# Patient Record
Sex: MALE | Race: WHITE | Employment: FULL TIME | URBAN - METROPOLITAN AREA
[De-identification: names, ages, dates, MRNs, and addresses within clinical notes are randomized per-mention and may not be internally consistent; named-entity substitution may affect disease eponyms.]

---

## 2017-08-12 ENCOUNTER — HOSPITAL ENCOUNTER (EMERGENCY)
Age: 52
Discharge: HOME | End: 2017-08-12
Attending: EMERGENCY MEDICINE
Payer: COMMERCIAL

## 2017-08-12 VITALS
RESPIRATION RATE: 20 BRPM | WEIGHT: 165 LBS | BODY MASS INDEX: 24.44 KG/M2 | HEIGHT: 69 IN | HEART RATE: 79 BPM | SYSTOLIC BLOOD PRESSURE: 140 MMHG | DIASTOLIC BLOOD PRESSURE: 91 MMHG | OXYGEN SATURATION: 98 %

## 2017-08-12 DIAGNOSIS — H66.90 ACUTE OTITIS MEDIA, UNSPECIFIED LATERALITY, UNSPECIFIED OTITIS MEDIA TYPE: ICD-10-CM

## 2017-08-12 DIAGNOSIS — H60.501 ACUTE OTITIS EXTERNA OF RIGHT EAR, UNSPECIFIED TYPE: Primary | ICD-10-CM

## 2017-08-12 PROCEDURE — 99282 EMERGENCY DEPT VISIT SF MDM: CPT

## 2017-08-12 RX ORDER — AMOXICILLIN AND CLAVULANATE POTASSIUM 875; 125 MG/1; MG/1
1 TABLET, FILM COATED ORAL 2 TIMES DAILY
Qty: 20 TAB | Refills: 0 | Status: SHIPPED | OUTPATIENT
Start: 2017-08-12 | End: 2017-08-22

## 2017-08-12 RX ORDER — ONDANSETRON 4 MG/1
4 TABLET, ORALLY DISINTEGRATING ORAL
Qty: 10 TAB | Refills: 0 | Status: SHIPPED | OUTPATIENT
Start: 2017-08-12 | End: 2017-09-01

## 2017-08-12 RX ORDER — HYDROCODONE BITARTRATE AND ACETAMINOPHEN 5; 325 MG/1; MG/1
1 TABLET ORAL
Qty: 20 TAB | Refills: 0 | Status: SHIPPED | OUTPATIENT
Start: 2017-08-12 | End: 2017-09-01

## 2017-08-12 RX ORDER — CIPROFLOXACIN AND DEXAMETHASONE 3; 1 MG/ML; MG/ML
4 SUSPENSION/ DROPS AURICULAR (OTIC) 2 TIMES DAILY
Qty: 7.5 ML | Refills: 0 | Status: SHIPPED | OUTPATIENT
Start: 2017-08-12 | End: 2017-09-01

## 2017-08-12 NOTE — DISCHARGE INSTRUCTIONS
Swimmer's Ear: Care Instructions  Your Care Instructions    Swimmer's ear (otitis externa) is inflammation or infection of the ear canal. This is the passage that leads from the outer ear to the eardrum. Any water, sand, or other debris that gets into the ear canal and stays there can cause swimmer's ear. Putting cotton swabs or other items in the ear to clean it can also cause this problem. Swimmer's ear can be very painful. But you can treat the pain and infection with medicines. You should feel better in a few days. Follow-up care is a key part of your treatment and safety. Be sure to make and go to all appointments, and call your doctor if you are having problems. It's also a good idea to know your test results and keep a list of the medicines you take. How can you care for yourself at home? Cleaning and care  · Use antibiotic drops as your doctor directs. · Do not insert ear drops (other than the antibiotic ear drops) or anything else into the ear unless your doctor has told you to. · Avoid getting water in the ear until the problem clears up. Use cotton lightly coated with petroleum jelly as an earplug. Do not use plastic earplugs. · Use a hair dryer set on low to carefully dry the ear after you shower. · To ease ear pain, hold a warm washcloth against your ear. · Take pain medicines exactly as directed. ¨ If the doctor gave you a prescription medicine for pain, take it as prescribed. ¨ If you are not taking a prescription pain medicine, ask your doctor if you can take an over-the-counter medicine. Inserting ear drops  · Warm the drops to body temperature by rolling the container in your hands. Or you can place it in a cup of warm water for a few minutes. · Lie down, with your ear facing up. · Place drops inside the ear. Follow your doctor's instructions (or the directions on the label) for how many drops to use.  Gently wiggle the outer ear or pull the ear up and back to help the drops get into the ear. · It's important to keep the liquid in the ear canal for 3 to 5 minutes. When should you call for help? Call your doctor now or seek immediate medical care if:  · You have a new or higher fever. · You have new or worse pain, swelling, warmth, or redness around or behind your ear. · You have new or increasing pus or blood draining from your ear. Watch closely for changes in your health, and be sure to contact your doctor if:  · You are not getting better after 2 days (48 hours). Where can you learn more? Go to http://abdullahi-gallito.info/. Enter C706 in the search box to learn more about \"Swimmer's Ear: Care Instructions. \"  Current as of: July 29, 2016  Content Version: 11.3  © 4643-8984 [employer]. Care instructions adapted under license by [employer] (which disclaims liability or warranty for this information). If you have questions about a medical condition or this instruction, always ask your healthcare professional. [place] [address] any warranty or liability for your use of this information. Ear Infection (Otitis Media): Care Instructions  Your Care Instructions    An ear infection may start with a cold and affect the middle ear (otitis media). It can hurt a lot. Most ear infections clear up on their own in a couple of days. Most often you will not need antibiotics. This is because many ear infections are caused by a virus. Antibiotics don't work against a virus. Regular doses of pain medicines are the best way to reduce your fever and help you feel better. Follow-up care is a key part of your treatment and safety. Be sure to make and go to all appointments, and call your doctor if you are having problems. It's also a good idea to know your test results and keep a list of the medicines you take. How can you care for yourself at home? · Take pain medicines exactly as directed.   ¨ If the doctor gave you a prescription medicine for pain, take it as prescribed. ¨ If you are not taking a prescription pain medicine, take an over-the-counter medicine, such as acetaminophen (Tylenol), ibuprofen (Advil, Motrin), or naproxen (Aleve). Read and follow all instructions on the label. ¨ Do not take two or more pain medicines at the same time unless the doctor told you to. Many pain medicines have acetaminophen, which is Tylenol. Too much acetaminophen (Tylenol) can be harmful. · Plan to take a full dose of pain reliever before bedtime. Getting enough sleep will help you get better. · Try a warm, moist washcloth on the ear. It may help relieve pain. · If your doctor prescribed antibiotics, take them as directed. Do not stop taking them just because you feel better. You need to take the full course of antibiotics. When should you call for help? Call your doctor now or seek immediate medical care if:  · You have new or increasing ear pain. · You have new or increasing pus or blood draining from your ear. · You have a fever with a stiff neck or a severe headache. Watch closely for changes in your health, and be sure to contact your doctor if:  · You have new or worse symptoms. · You are not getting better after taking an antibiotic for 2 days. Where can you learn more? Go to http://abdullahi-gallito.info/. Enter N012 in the search box to learn more about \"Ear Infection (Otitis Media): Care Instructions. \"  Current as of: May 4, 2017  Content Version: 11.3  © 3695-5746 [employer]. Care instructions adapted under license by [employer] (which disclaims liability or warranty for this information). If you have questions about a medical condition or this instruction, always ask your healthcare professional. [address] any warranty or liability for your use of this information. We hope that we have addressed all of your medical concerns.  The examination and treatment you received in the Emergency Department were for an emergent problem and were not intended as complete care. It is important that you follow up with your healthcare provider(s) for ongoing care. If your symptoms worsen or do not improve as expected, and you are unable to reach your usual health care provider(s), you should return to the Emergency Department. Today's healthcare is undergoing tremendous change, and patient satisfaction surveys are one of the many tools to assess the quality of medical care. You may receive a survey from the [employer] regarding your experience in the Emergency Department. I hope that your experience has been completely positive, particularly the medical care that I provided. As such, please participate in the survey; anything less than excellent does not meet my expectations or intentions. [facility] and [facility] participate in nationally recognized quality of care measures. If your blood pressure is greater than 120/80, as reported below, we urge that you seek medical care to address the potential of high blood pressure, commonly known as hypertension. Hypertension can be hereditary or can be caused by certain medical conditions, pain, stress, or \"white coat syndrome. \"       Please make an appointment with your health care provider(s) for follow up of your Emergency Department visit. VITALS:   Patient Vitals for the past 8 hrs:   Pulse Resp BP SpO2   08/12/17 0715 79 20 (!) 140/91 98 %          Thank you for allowing us to provide you with medical care today. We realize that you have many choices for your emergency care needs. Please choose us in the future for any continued health care needs. Eugenia Cole, [address] [place] [address].   Office: [phone]

## 2017-08-12 NOTE — ED NOTES
MD reviewed discharge instructions and options with patient; patient verbalized understanding. RN reviewed discharge instructions using teachback method. Pt. Was wheeled to exit accompanied by RN due to unsteady gait and dizziness. Pt was escorted by father who will drive him home. Patient was counseled on medications prescribed at discharge. Patient to call PCP on Monday for follow up.

## 2017-08-12 NOTE — ED PROVIDER NOTES
HPI Comments: 47 y.o. male who presents from home via private vehicle with chief complaint of right ear pain. Pt reports that he was seen by his PCP 3 days ago c/o right ear pain and was dx with an ear infection. He was started on Augmentin 3 nights ago and has been using OTC ear drops for \"ear ache\" without relief. Pt reports one episode of vomiting this morning with continued nausea after taking two ASA. He took his morning dose of ABx after vomiting. Pt additionally c/o not sleeping well for the past 2 nights, sore throat, dizziness, and decreased hearing ability on the right side. Pt denies any other pain, fever, ear drainage, and any bowel or bladder complaints. There are no other acute medical concerns at this time. Social hx:       7:36 AM      The history is provided by the patient. No  was used. No past medical history on file. No past surgical history on file. No family history on file. Social History     Social History    Marital status: SINGLE     Spouse name: N/A    Number of children: N/A    Years of education: N/A     Occupational History    Not on file. Social History Main Topics    Smoking status: Not on file    Smokeless tobacco: Not on file    Alcohol use Not on file    Drug use: Not on file    Sexual activity: Not on file     Other Topics Concern    Not on file     Social History Narrative         ALLERGIES: Review of patient's allergies indicates no known allergies. Review of Systems   Constitutional: Negative for chills and fever. HENT: Positive for ear pain, hearing loss and sore throat. Negative for ear discharge and rhinorrhea. Respiratory: Negative for cough and shortness of breath. Cardiovascular: Negative for chest pain. Gastrointestinal: Positive for nausea and vomiting. Negative for abdominal pain and diarrhea. Genitourinary: Negative for dysuria and hematuria. Musculoskeletal: Negative for arthralgias and myalgias. Skin: Negative for pallor and rash. Neurological: Positive for dizziness. Negative for weakness and light-headedness. Psychiatric/Behavioral: Positive for sleep disturbance. All other systems reviewed and are negative. Vitals:    08/12/17 0715   BP: (!) 140/91   Pulse: 79   Resp: 20   SpO2: 98%   Weight: 74.8 kg (165 lb)   Height: 5' 9\" (1.753 m)            Physical Exam     Const:  No acute distress, well developed, well nourished  Head:  Atraumatic, normocephalic. No tenderness over the mastoid. Right ear: Swelling and erythema of the ear canal with drainage. Swelling prevents visualization of the TM. Pain in inner ear with pulling on pinna. Left ear: Normal  Mouth/throat: Posterior oropharynx normal.    Eyes:  PERRL, conjunctiva normal, no scleral icterus  Neck:  Supple, trachea midline  Cardiovascular:  RRR, no murmurs, no gallops, no rubs  Resp:  No resp distress, no increased work of breathing, no wheezes, no rhonchi, no rales,  Abd:  Soft, non-tender, non-distended, no rebound, no guarding, no CVA tenderness  :  Deferred  MSK:  No pedal edema, normal ROM  Neuro:  Alert and oriented x3, no cranial nerve defect  Skin:  Warm, dry, intact  Psych: normal mood and affect, behavior is normal, judgement and thought content is normal    Note written by Mame Rivero, as dictated by Audi Han MD 7:51 AM      MDM  Number of Diagnoses or Management Options  Acute otitis externa of right ear, unspecified type:   Acute otitis media, unspecified laterality, unspecified otitis media type:      Amount and/or Complexity of Data Reviewed  Clinical lab tests: ordered and reviewed  Tests in the radiology section of CPT®: ordered and reviewed  Review and summarize past medical records: yes    Patient Progress  Patient progress: stable    ED Course     Pt. Presents to the ER with right ear pain. Pt. Was recently started on amoxicillin for presumed otitis media.   On exam, he has otitis externa, however, I cannot see his TM, so I will also cover him for otitis media with possible perforated TM. I will cover him with ciprodex and augmentin. Pt. To stop the amoxicillin that he is currently on. I will also add norco and zofran for sx control. Pt. To f/u with his PCP in 2 days or return to the ER with worsening sx.       Procedures

## 2017-08-13 ENCOUNTER — HOSPITAL ENCOUNTER (EMERGENCY)
Age: 52
Discharge: HOME | End: 2017-08-13
Attending: EMERGENCY MEDICINE
Payer: COMMERCIAL

## 2017-08-13 ENCOUNTER — APPOINTMENT (OUTPATIENT)
Dept: CT IMAGING | Age: 52
End: 2017-08-13
Attending: EMERGENCY MEDICINE
Payer: COMMERCIAL

## 2017-08-13 VITALS
SYSTOLIC BLOOD PRESSURE: 108 MMHG | HEIGHT: 69 IN | HEART RATE: 69 BPM | OXYGEN SATURATION: 99 % | DIASTOLIC BLOOD PRESSURE: 64 MMHG | TEMPERATURE: 98.1 F | BODY MASS INDEX: 24.44 KG/M2 | RESPIRATION RATE: 15 BRPM | WEIGHT: 165 LBS

## 2017-08-13 DIAGNOSIS — R42 VERTIGO: ICD-10-CM

## 2017-08-13 DIAGNOSIS — H60.311 ACUTE DIFFUSE OTITIS EXTERNA OF RIGHT EAR: Primary | ICD-10-CM

## 2017-08-13 LAB
ANION GAP BLD CALC-SCNC: 21 MMOL/L (ref 5–15)
BUN BLD-MCNC: 12 MG/DL (ref 9–20)
CA-I BLD-MCNC: 1.11 MMOL/L (ref 1.12–1.32)
CHLORIDE BLD-SCNC: 101 MMOL/L (ref 98–107)
CO2 BLD-SCNC: 23 MMOL/L (ref 21–32)
CREAT BLD-MCNC: 1 MG/DL (ref 0.6–1.3)
GLUCOSE BLD-MCNC: 119 MG/DL (ref 65–100)
HCT VFR BLD CALC: 48 % (ref 36.6–50.3)
HGB BLD-MCNC: 16.3 GM/DL (ref 12.1–17)
POTASSIUM BLD-SCNC: 4 MMOL/L (ref 3.5–5.1)
SERVICE CMNT-IMP: ABNORMAL
SODIUM BLD-SCNC: 140 MMOL/L (ref 136–145)

## 2017-08-13 PROCEDURE — 96361 HYDRATE IV INFUSION ADD-ON: CPT

## 2017-08-13 PROCEDURE — 70486 CT MAXILLOFACIAL W/O DYE: CPT

## 2017-08-13 PROCEDURE — 74011250636 HC RX REV CODE- 250/636: Performed by: EMERGENCY MEDICINE

## 2017-08-13 PROCEDURE — 99285 EMERGENCY DEPT VISIT HI MDM: CPT

## 2017-08-13 PROCEDURE — 96365 THER/PROPH/DIAG IV INF INIT: CPT

## 2017-08-13 PROCEDURE — 70450 CT HEAD/BRAIN W/O DYE: CPT

## 2017-08-13 PROCEDURE — 96375 TX/PRO/DX INJ NEW DRUG ADDON: CPT

## 2017-08-13 PROCEDURE — 74011000258 HC RX REV CODE- 258: Performed by: EMERGENCY MEDICINE

## 2017-08-13 PROCEDURE — 80047 BASIC METABLC PNL IONIZED CA: CPT

## 2017-08-13 RX ORDER — SODIUM CHLORIDE 0.9 % (FLUSH) 0.9 %
5-10 SYRINGE (ML) INJECTION AS NEEDED
Status: DISCONTINUED | OUTPATIENT
Start: 2017-08-13 | End: 2017-08-13 | Stop reason: HOSPADM

## 2017-08-13 RX ORDER — HYDROMORPHONE HYDROCHLORIDE 1 MG/ML
1 INJECTION, SOLUTION INTRAMUSCULAR; INTRAVENOUS; SUBCUTANEOUS
Status: DISCONTINUED | OUTPATIENT
Start: 2017-08-13 | End: 2017-08-13 | Stop reason: HOSPADM

## 2017-08-13 RX ORDER — ONDANSETRON 2 MG/ML
4 INJECTION INTRAMUSCULAR; INTRAVENOUS
Status: COMPLETED | OUTPATIENT
Start: 2017-08-13 | End: 2017-08-13

## 2017-08-13 RX ORDER — DIMENHYDRINATE 50 MG
50 TABLET ORAL
COMMUNITY
End: 2017-09-01

## 2017-08-13 RX ORDER — MECLIZINE HYDROCHLORIDE 25 MG/1
25 TABLET ORAL
Qty: 30 TAB | Refills: 0 | Status: SHIPPED | OUTPATIENT
Start: 2017-08-13 | End: 2017-08-23

## 2017-08-13 RX ORDER — SODIUM CHLORIDE 0.9 % (FLUSH) 0.9 %
5-10 SYRINGE (ML) INJECTION EVERY 8 HOURS
Status: DISCONTINUED | OUTPATIENT
Start: 2017-08-13 | End: 2017-08-13 | Stop reason: HOSPADM

## 2017-08-13 RX ADMIN — Medication 10 ML: at 09:45

## 2017-08-13 RX ADMIN — PIPERACILLIN SODIUM,TAZOBACTAM SODIUM 3.38 G: 3; .375 INJECTION, POWDER, FOR SOLUTION INTRAVENOUS at 10:00

## 2017-08-13 RX ADMIN — SODIUM CHLORIDE 1000 ML: 900 INJECTION, SOLUTION INTRAVENOUS at 09:53

## 2017-08-13 RX ADMIN — HYDROMORPHONE HYDROCHLORIDE 1 MG: 1 INJECTION, SOLUTION INTRAMUSCULAR; INTRAVENOUS; SUBCUTANEOUS at 09:54

## 2017-08-13 RX ADMIN — ONDANSETRON 4 MG: 2 INJECTION INTRAMUSCULAR; INTRAVENOUS at 09:53

## 2017-08-13 NOTE — ED TRIAGE NOTES
Pt states he was seen here yesterday for an ear infection. States he has been vomiting for smooth 4 days. Now is unable to keep meds down that were given to him yesterday. States having increased swelling and pain in right side of face and ear.

## 2017-08-13 NOTE — DISCHARGE INSTRUCTIONS
Dizziness: Care Instructions  Your Care Instructions  Dizziness is the feeling of unsteadiness or fuzziness in your head. It is different than having vertigo, which is a feeling that the room is spinning or that you are moving or falling. It is also different from lightheadedness, which is the feeling that you are about to faint. It can be hard to know what causes dizziness. Some people feel dizzy when they have migraine headaches. Sometimes bouts of flu can make you feel dizzy. Some medical conditions, such as heart problems or high blood pressure, can make you feel dizzy. Many medicines can cause dizziness, including medicines for high blood pressure, pain, or anxiety. If a medicine causes your symptoms, your doctor may recommend that you stop or change the medicine. If it is a problem with your heart, you may need medicine to help your heart work better. If there is no clear reason for your symptoms, your doctor may suggest watching and waiting for a while to see if the dizziness goes away on its own. Follow-up care is a key part of your treatment and safety. Be sure to make and go to all appointments, and call your doctor if you are having problems. It's also a good idea to know your test results and keep a list of the medicines you take. How can you care for yourself at home? · If your doctor recommends or prescribes medicine, take it exactly as directed. Call your doctor if you think you are having a problem with your medicine. · Do not drive while you feel dizzy. · Try to prevent falls. Steps you can take include:  ¨ Using nonskid mats, adding grab bars near the tub, and using night-lights. ¨ Clearing your home so that walkways are free of anything you might trip on. ¨ Letting family and friends know that you have been feeling dizzy. This will help them know how to help you. When should you call for help? Call 911 anytime you think you may need emergency care.  For example, call if:  · You passed out (lost consciousness). · You have dizziness along with symptoms of a heart attack. These may include:  ¨ Chest pain or pressure, or a strange feeling in the chest.  ¨ Sweating. ¨ Shortness of breath. ¨ Nausea or vomiting. ¨ Pain, pressure, or a strange feeling in the back, neck, jaw, or upper belly or in one or both shoulders or arms. ¨ Lightheadedness or sudden weakness. ¨ A fast or irregular heartbeat. · You have symptoms of a stroke. These may include:  ¨ Sudden numbness, tingling, weakness, or loss of movement in your face, arm, or leg, especially on only one side of your body. ¨ Sudden vision changes. ¨ Sudden trouble speaking. ¨ Sudden confusion or trouble understanding simple statements. ¨ Sudden problems with walking or balance. ¨ A sudden, severe headache that is different from past headaches. Call your doctor now or seek immediate medical care if:  · You feel dizzy and have a fever, headache, or ringing in your ears. · You have new or increased nausea and vomiting. · Your dizziness does not go away or comes back. Watch closely for changes in your health, and be sure to contact your doctor if:  · You do not get better as expected. Where can you learn more? Go to http://abdullahi-gallito.info/. Enter N532 in the search box to learn more about \"Dizziness: Care Instructions. \"  Current as of: March 20, 2017  Content Version: 11.3  © 6381-5209 [employer]. Care instructions adapted under license by [employer] (which disclaims liability or warranty for this information). If you have questions about a medical condition or this instruction, always ask your healthcare professional. [place] [address] any warranty or liability for your use of this information.          Swimmer's Ear: Care Instructions  Your Care Instructions    Swimmer's ear (otitis externa) is inflammation or infection of the ear canal. This is the passage that leads from the outer ear to the eardrum. Any water, sand, or other debris that gets into the ear canal and stays there can cause swimmer's ear. Putting cotton swabs or other items in the ear to clean it can also cause this problem. Swimmer's ear can be very painful. But you can treat the pain and infection with medicines. You should feel better in a few days. Follow-up care is a key part of your treatment and safety. Be sure to make and go to all appointments, and call your doctor if you are having problems. It's also a good idea to know your test results and keep a list of the medicines you take. How can you care for yourself at home? Cleaning and care  · Use antibiotic drops as your doctor directs. · Do not insert ear drops (other than the antibiotic ear drops) or anything else into the ear unless your doctor has told you to. · Avoid getting water in the ear until the problem clears up. Use cotton lightly coated with petroleum jelly as an earplug. Do not use plastic earplugs. · Use a hair dryer set on low to carefully dry the ear after you shower. · To ease ear pain, hold a warm washcloth against your ear. · Take pain medicines exactly as directed. ¨ If the doctor gave you a prescription medicine for pain, take it as prescribed. ¨ If you are not taking a prescription pain medicine, ask your doctor if you can take an over-the-counter medicine. Inserting ear drops  · Warm the drops to body temperature by rolling the container in your hands. Or you can place it in a cup of warm water for a few minutes. · Lie down, with your ear facing up. · Place drops inside the ear. Follow your doctor's instructions (or the directions on the label) for how many drops to use. Gently wiggle the outer ear or pull the ear up and back to help the drops get into the ear. · It's important to keep the liquid in the ear canal for 3 to 5 minutes. When should you call for help?   Call your doctor now or seek immediate medical care if:  · You have a new or higher fever. · You have new or worse pain, swelling, warmth, or redness around or behind your ear. · You have new or increasing pus or blood draining from your ear. Watch closely for changes in your health, and be sure to contact your doctor if:  · You are not getting better after 2 days (48 hours). Where can you learn more? Go to http://abdullahi-gallito.info/. Enter C706 in the search box to learn more about \"Swimmer's Ear: Care Instructions. \"  Current as of: July 29, 2016  Content Version: 11.3  © 6384-6651 [employer]. Care instructions adapted under license by [employer] (which disclaims liability or warranty for this information). If you have questions about a medical condition or this instruction, always ask your healthcare professional. [place] [address] any warranty or liability for your use of this information. We hope that we have addressed all of your medical concerns. The examination and treatment you received in the Emergency Department were for an emergent problem and were not intended as complete care. It is important that you follow up with your healthcare provider(s) for ongoing care. If your symptoms worsen or do not improve as expected, and you are unable to reach your usual health care provider(s), you should return to the Emergency Department. Today's healthcare is undergoing tremendous change, and patient satisfaction surveys are one of the many tools to assess the quality of medical care. You may receive a survey from the [employer] organization regarding your experience in the Emergency Department. I hope that your experience has been completely positive, particularly the medical care that I provided. As such, please participate in the survey; anything less than excellent does not meet my expectations or intentions.         [address] [facility] and [address] [place] [address] participate in nationally recognized quality of care measures. If your blood pressure is greater than 120/80, as reported below, we urge that you seek medical care to address the potential of high blood pressure, commonly known as hypertension. Hypertension can be hereditary or can be caused by certain medical conditions, pain, stress, or \"white coat syndrome. \"       Please make an appointment with your health care provider(s) for follow up of your Emergency Department visit. VITALS:   Patient Vitals for the past 8 hrs:   Temp Pulse Resp BP SpO2   08/13/17 1100 - 63 19 113/66 95 %   08/13/17 1030 - 65 10 (!) 110/97 98 %   08/13/17 1014 - 61 14 116/74 99 %   08/13/17 1000 - 68 16 110/71 98 %   08/13/17 0928 98.1 °F (36.7 °C) 73 18 128/70 99 %          Thank you for allowing us to provide you with medical care today. We realize that you have many choices for your emergency care needs. Please choose us in the future for any continued health care needs. Regards,           Lexx Peralta, [address] [place] [address].   Office: [phone]            Recent Results (from the past 24 hour(s))   POC CHEM8    Collection Time: 08/13/17  9:48 AM   Result Value Ref Range    Calcium, ionized (POC) 1.11 (L) 1.12 - 1.32 MMOL/L    Sodium (POC) 140 136 - 145 MMOL/L    Potassium (POC) 4.0 3.5 - 5.1 MMOL/L    Chloride (POC) 101 98 - 107 MMOL/L    CO2 (POC) 23 21 - 32 MMOL/L    Anion gap (POC) 21 (H) 5 - 15 mmol/L    Glucose (POC) 119 (H) 65 - 100 MG/DL    BUN (POC) 12 9 - 20 MG/DL    Creatinine (POC) 1.0 0.6 - 1.3 MG/DL    GFRAA, POC >60 >60 ml/min/1.73m2    GFRNA, POC >60 >60 ml/min/1.73m2    Hemoglobin (POC) 16.3 12.1 - 17.0 GM/DL    Hematocrit (POC) 48 36.6 - 50.3 %    Comment Notified RN or MD immediately by          Ct Head Wo Cont    Result Date: 8/13/2017  EXAM:  CT HEAD WO CONT INDICATION:   vertigo, ataxia COMPARISON: None. CONTRAST:  None.  TECHNIQUE: Unenhanced CT of the head was performed using 5 mm images. Brain and bone windows were generated. CT dose reduction was achieved through use of a standardized protocol tailored for this examination and automatic exposure control for dose modulation. FINDINGS: There is no extra-axial fluid collection hemorrhage shift or masses. impression: No acute findings. Ct Maxillofacial Wo Cont    Result Date: 8/13/2017  EXAM:  CT MAXILLOFACIAL WO CONT INDICATION:   swelling, pain, erythema to right ear COMPARISON:  None. CONTRAST:   None. TECHNIQUE:  Multislice helical CT of the facial bones was performed in the axial plane without intravenous contrast administration. Coronal and sagittal reformations were generated. CT dose reduction was achieved through use of a standardized protocol tailored for this examination and automatic exposure control for dose modulation. FINDINGS: There is no fracture. There is superficial soft tissue swelling around the right ear but no fluid collection     impression: No fluid collection.

## 2017-08-13 NOTE — ED NOTES
Bedside and Verbal shift change report given to Queen Iveth RN (oncoming nurse) by Marine Werner RN (offgoing nurse). Report included the following information SBAR, ED Summary and MAR.

## 2017-08-13 NOTE — ED PROVIDER NOTES
HPI Comments: 47 y.o. male with no significant past medical history who presents from home with chief complaint of vomiting. Pt began experiencing ear pain 4 days ago and went into [facility] where he was given Abx with no relief. Pt's symptoms began to worsen and 3 days ago he began experiencing ear swelling, vomiting, and dizziness. Pt was then seen in the ED yesterday where he was diagnosed with acute otitis externa of the R-ear and d/c home with medications. Despite this, pt reports his sx have worsened, currently c/o 10/10 ear pain, vomiting, and new dizziness that \"feels like he is going to fall down\", and weakness with \"inability to walk or stand. \" Pt last vomited ~1 hour ago and states he thinks he threw up his ABx. Pt states that his throat feels \"raw. \"  Pt denies any recent injuries to head or ear, falls, or swimming. Pt denies taking any medications. There are no other acute medical concerns at this time. Old Chart Review: Pt was seen in the ED on 8/12/17 by Dr. Sasha Trejo for acute otitis external of the R-ear. Pt c/o dizziness, sleep disturbance, decreased hearing, and a sore throat at the time. Pt was given Ciprodex, Augmentin, norco, and Zofran for presumed otitis media. Social hx: (-) tobacco use; (-) EtOH use. Note written by Mame Hopson, as dictated by Andree Diamond MD 9:35am     The history is provided by the patient and a parent. No  was used. No past medical history on file. No past surgical history on file. No family history on file. Social History     Social History    Marital status: SINGLE     Spouse name: N/A    Number of children: N/A    Years of education: N/A     Occupational History    Not on file.      Social History Main Topics    Smoking status: Never Smoker    Smokeless tobacco: Never Used    Alcohol use No    Drug use: Not on file    Sexual activity: Not on file     Other Topics Concern    Not on file Social History Narrative    No narrative on file         ALLERGIES: Review of patient's allergies indicates no known allergies. Review of Systems   Constitutional: Negative for appetite change, fever and unexpected weight change. HENT: Positive for ear pain, facial swelling and sore throat. Negative for hearing loss, nosebleeds, rhinorrhea and trouble swallowing. Respiratory: Negative. Negative for cough, chest tightness and shortness of breath. Cardiovascular: Negative. Negative for chest pain and palpitations. Gastrointestinal: Positive for nausea and vomiting. Negative for abdominal distention, abdominal pain and blood in stool. Endocrine: Negative. Genitourinary: Negative for dysuria and hematuria. Musculoskeletal: Negative. Negative for back pain and myalgias. Skin: Negative. Negative for rash. Allergic/Immunologic: Negative. Neurological: Positive for dizziness and weakness. Negative for syncope and numbness. Hematological: Negative. Psychiatric/Behavioral: Negative. All other systems reviewed and are negative. Vitals:    08/13/17 0928   BP: 128/70   Pulse: 73   Resp: 18   Temp: 98.1 °F (36.7 °C)   SpO2: 99%   Weight: 74.8 kg (165 lb)   Height: 5' 9\" (1.753 m)            Physical Exam   Constitutional: He is oriented to person, place, and time. He appears well-developed and well-nourished. He appears distressed. Nontoxic. Moderate pain distress. Somewhat ataxic when standing. Awake, alert, and oriented with no acute focal deficits. HENT:   Head: Normocephalic and atraumatic. Right Ear: External ear normal.   Left Ear: External ear normal.   Nose: Nose normal.   Mouth/Throat: Oropharynx is clear and moist.   Normal except for moderate swelling to the R-external ear canal as well as the external ear itself with some erythema and moderate tenderness to palpation. No obvious abscess. No parotid or mastoid tenderness.  Mild horizontal nystagmus when looking to the left. Eyes: Conjunctivae are normal. Pupils are equal, round, and reactive to light. Left eye exhibits nystagmus. Normal fundoscopic exam.    Neck: Normal range of motion. Neck supple. No JVD present. No thyromegaly present. Cardiovascular: Normal rate, regular rhythm, normal heart sounds and intact distal pulses. No murmur heard. Pulmonary/Chest: Effort normal and breath sounds normal. No respiratory distress. He has no wheezes. He has no rales. Abdominal: Soft. Bowel sounds are normal. He exhibits no distension. There is no tenderness. Musculoskeletal: Normal range of motion. He exhibits no edema. Neurological: He is alert and oriented to person, place, and time. No cranial nerve deficit. Somewhat ataxic when standing. Skin: Skin is warm and dry. No rash noted. Psychiatric: He has a normal mood and affect. His behavior is normal. Thought content normal.      Note written by Mame Vega, as dictated by Yadira Merrill MD 9:35am    [facility]  ED Course       Procedures        PROGRESS NOTE:  11:13 AM  Assessment and Plan: Chem 8 is unremarkable. Head CT normal. Maxillofacial CT shows soft tissue swelling but no fluid collection around R-ear. Pt was given a dose of Abx in the ER as well as pain medication with improvement of symptoms. Will discharge the pt home and add Meclizine for his dizziness. Pt is advised to continue his home abx until finished and to f/u with ENT this week.

## 2017-08-13 NOTE — ED NOTES
Assumed care of pt. Bed locked and in low position with call bell within reach. Using AIDET-Introduced self as Primary RN and plan discussed with pt with understanding was verbalized. Pt denies additional complaints at this time. White board updated with this nurse's name. Patient advised that medical information will be discussed and it is their own responsibility to tell nurse if such conversation should not take place in the presence of visitors. Pt verbalizes understanding. Family at bedside.

## 2017-09-01 ENCOUNTER — APPOINTMENT (OUTPATIENT)
Dept: CT IMAGING | Age: 52
DRG: 074 | End: 2017-09-01
Attending: PHYSICIAN ASSISTANT
Payer: COMMERCIAL

## 2017-09-01 ENCOUNTER — HOSPITAL ENCOUNTER (INPATIENT)
Age: 52
LOS: 3 days | Discharge: HOME | DRG: 074 | End: 2017-09-04
Attending: EMERGENCY MEDICINE | Admitting: INTERNAL MEDICINE
Payer: COMMERCIAL

## 2017-09-01 ENCOUNTER — APPOINTMENT (OUTPATIENT)
Dept: CT IMAGING | Age: 52
DRG: 074 | End: 2017-09-01
Attending: INTERNAL MEDICINE
Payer: COMMERCIAL

## 2017-09-01 ENCOUNTER — APPOINTMENT (OUTPATIENT)
Dept: GENERAL RADIOLOGY | Age: 52
DRG: 074 | End: 2017-09-01
Attending: INTERNAL MEDICINE
Payer: COMMERCIAL

## 2017-09-01 ENCOUNTER — APPOINTMENT (OUTPATIENT)
Dept: MRI IMAGING | Age: 52
DRG: 074 | End: 2017-09-01
Attending: INTERNAL MEDICINE
Payer: COMMERCIAL

## 2017-09-01 ENCOUNTER — APPOINTMENT (OUTPATIENT)
Dept: ULTRASOUND IMAGING | Age: 52
DRG: 074 | End: 2017-09-01
Attending: INTERNAL MEDICINE
Payer: COMMERCIAL

## 2017-09-01 DIAGNOSIS — R63.4 WEIGHT LOSS, NON-INTENTIONAL: ICD-10-CM

## 2017-09-01 DIAGNOSIS — B02.29 HERPES ZOSTER VIRUS INFECTION OF FACE AND EAR NERVES: ICD-10-CM

## 2017-09-01 DIAGNOSIS — R11.2 NAUSEA AND VOMITING IN ADULT: ICD-10-CM

## 2017-09-01 DIAGNOSIS — R17 JAUNDICE: Primary | ICD-10-CM

## 2017-09-01 DIAGNOSIS — K82.8 PORCELAIN GALLBLADDER: ICD-10-CM

## 2017-09-01 PROBLEM — D72.829 LEUKOCYTOSIS: Status: ACTIVE | Noted: 2017-09-01

## 2017-09-01 PROBLEM — E80.6 HYPERBILIRUBINEMIA: Status: ACTIVE | Noted: 2017-09-01

## 2017-09-01 PROBLEM — N28.9 ACUTE RENAL INSUFFICIENCY: Status: ACTIVE | Noted: 2017-09-01

## 2017-09-01 PROBLEM — R29.810 FACIAL WEAKNESS: Status: ACTIVE | Noted: 2017-09-01

## 2017-09-01 PROBLEM — E87.1 HYPONATREMIA: Status: ACTIVE | Noted: 2017-09-01

## 2017-09-01 LAB
ALBUMIN SERPL-MCNC: 2.9 G/DL (ref 3.5–5)
ALBUMIN SERPL-MCNC: 3.7 G/DL (ref 3.5–5)
ALBUMIN/GLOB SERPL: 1 {RATIO} (ref 1.1–2.2)
ALBUMIN/GLOB SERPL: 1.1 {RATIO} (ref 1.1–2.2)
ALP SERPL-CCNC: 72 U/L (ref 45–117)
ALP SERPL-CCNC: 90 U/L (ref 45–117)
ALT SERPL-CCNC: 41 U/L (ref 12–78)
ALT SERPL-CCNC: 57 U/L (ref 12–78)
AMMONIA PLAS-SCNC: 18 UMOL/L
AMORPH CRY URNS QL MICRO: ABNORMAL
ANION GAP SERPL CALC-SCNC: 10 MMOL/L (ref 5–15)
ANION GAP SERPL CALC-SCNC: 11 MMOL/L (ref 5–15)
APPEARANCE UR: ABNORMAL
AST SERPL-CCNC: 21 U/L (ref 15–37)
AST SERPL-CCNC: 36 U/L (ref 15–37)
BACTERIA URNS QL MICRO: NEGATIVE /HPF
BASOPHILS # BLD: 0 K/UL (ref 0–0.1)
BASOPHILS NFR BLD: 0 % (ref 0–1)
BILIRUB DIRECT SERPL-MCNC: 0.2 MG/DL (ref 0–0.2)
BILIRUB INDIRECT SERPL-MCNC: 5.3 MG/DL (ref 0–1.1)
BILIRUB SERPL-MCNC: 5.5 MG/DL (ref 0.2–1)
BILIRUB SERPL-MCNC: 5.5 MG/DL (ref 0.2–1)
BILIRUB SERPL-MCNC: 7.2 MG/DL (ref 0.2–1)
BILIRUB UR QL: NEGATIVE
BUN SERPL-MCNC: 22 MG/DL (ref 6–20)
BUN SERPL-MCNC: 23 MG/DL (ref 6–20)
BUN/CREAT SERPL: 21 (ref 12–20)
BUN/CREAT SERPL: 21 (ref 12–20)
CALCIUM SERPL-MCNC: 8 MG/DL (ref 8.5–10.1)
CALCIUM SERPL-MCNC: 9.4 MG/DL (ref 8.5–10.1)
CHLORIDE SERPL-SCNC: 100 MMOL/L (ref 97–108)
CHLORIDE SERPL-SCNC: 106 MMOL/L (ref 97–108)
CO2 SERPL-SCNC: 21 MMOL/L (ref 21–32)
CO2 SERPL-SCNC: 22 MMOL/L (ref 21–32)
COLOR UR: ABNORMAL
CREAT SERPL-MCNC: 1.05 MG/DL (ref 0.7–1.3)
CREAT SERPL-MCNC: 1.08 MG/DL (ref 0.7–1.3)
EOSINOPHIL # BLD: 0.1 K/UL (ref 0–0.4)
EOSINOPHIL NFR BLD: 1 % (ref 0–7)
EPITH CASTS URNS QL MICRO: ABNORMAL /LPF
ERYTHROCYTE [DISTWIDTH] IN BLOOD BY AUTOMATED COUNT: 13.5 % (ref 11.5–14.5)
GLOBULIN SER CALC-MCNC: 2.7 G/DL (ref 2–4)
GLOBULIN SER CALC-MCNC: 3.8 G/DL (ref 2–4)
GLUCOSE SERPL-MCNC: 105 MG/DL (ref 65–100)
GLUCOSE SERPL-MCNC: 114 MG/DL (ref 65–100)
GLUCOSE UR STRIP.AUTO-MCNC: NEGATIVE MG/DL
GRAN CASTS URNS QL MICRO: ABNORMAL /LPF
HAPTOGLOB SERPL-MCNC: 177 MG/DL (ref 30–200)
HCT VFR BLD AUTO: 50 % (ref 36.6–50.3)
HGB BLD-MCNC: 18 G/DL (ref 12.1–17)
HGB UR QL STRIP: NEGATIVE
INR PPP: 1.1 (ref 0.9–1.1)
KETONES UR QL STRIP.AUTO: 40 MG/DL
LACTATE SERPL-SCNC: 1.9 MMOL/L (ref 0.4–2)
LDH SERPL L TO P-CCNC: 171 U/L (ref 85–241)
LEUKOCYTE ESTERASE UR QL STRIP.AUTO: NEGATIVE
LIPASE SERPL-CCNC: 395 U/L (ref 73–393)
LYMPHOCYTES # BLD: 2.7 K/UL (ref 0.8–3.5)
LYMPHOCYTES NFR BLD: 20 % (ref 12–49)
MAGNESIUM SERPL-MCNC: 2.3 MG/DL (ref 1.6–2.4)
MCH RBC QN AUTO: 30.3 PG (ref 26–34)
MCHC RBC AUTO-ENTMCNC: 36 G/DL (ref 30–36.5)
MCV RBC AUTO: 84.2 FL (ref 80–99)
MONOCYTES # BLD: 1.5 K/UL (ref 0–1)
MONOCYTES NFR BLD: 11 % (ref 5–13)
NEUTS SEG # BLD: 9.2 K/UL (ref 1.8–8)
NEUTS SEG NFR BLD: 68 % (ref 32–75)
NITRITE UR QL STRIP.AUTO: NEGATIVE
PH UR STRIP: 7 [PH] (ref 5–8)
PLATELET # BLD AUTO: 269 K/UL (ref 150–400)
POTASSIUM SERPL-SCNC: 4.1 MMOL/L (ref 3.5–5.1)
POTASSIUM SERPL-SCNC: 4.8 MMOL/L (ref 3.5–5.1)
PROT SERPL-MCNC: 5.6 G/DL (ref 6.4–8.2)
PROT SERPL-MCNC: 7.5 G/DL (ref 6.4–8.2)
PROT UR STRIP-MCNC: ABNORMAL MG/DL
PROTHROMBIN TIME: 11.4 SEC (ref 9–11.1)
RBC # BLD AUTO: 5.94 M/UL (ref 4.1–5.7)
RBC #/AREA URNS HPF: ABNORMAL /HPF (ref 0–5)
SODIUM SERPL-SCNC: 132 MMOL/L (ref 136–145)
SODIUM SERPL-SCNC: 138 MMOL/L (ref 136–145)
SP GR UR REFRACTOMETRY: 1.02 (ref 1–1.03)
TROPONIN I SERPL-MCNC: <0.04 NG/ML
UROBILINOGEN UR QL STRIP.AUTO: 1 EU/DL (ref 0.2–1)
WBC # BLD AUTO: 13.5 K/UL (ref 4.1–11.1)
WBC URNS QL MICRO: ABNORMAL /HPF (ref 0–4)

## 2017-09-01 PROCEDURE — 80053 COMPREHEN METABOLIC PANEL: CPT | Performed by: PHYSICIAN ASSISTANT

## 2017-09-01 PROCEDURE — 81001 URINALYSIS AUTO W/SCOPE: CPT | Performed by: PHYSICIAN ASSISTANT

## 2017-09-01 PROCEDURE — 85025 COMPLETE CBC W/AUTO DIFF WBC: CPT | Performed by: PHYSICIAN ASSISTANT

## 2017-09-01 PROCEDURE — 74011250637 HC RX REV CODE- 250/637: Performed by: INTERNAL MEDICINE

## 2017-09-01 PROCEDURE — 84484 ASSAY OF TROPONIN QUANT: CPT | Performed by: PHYSICIAN ASSISTANT

## 2017-09-01 PROCEDURE — 36415 COLL VENOUS BLD VENIPUNCTURE: CPT | Performed by: PHYSICIAN ASSISTANT

## 2017-09-01 PROCEDURE — 96374 THER/PROPH/DIAG INJ IV PUSH: CPT

## 2017-09-01 PROCEDURE — 76705 ECHO EXAM OF ABDOMEN: CPT

## 2017-09-01 PROCEDURE — 85610 PROTHROMBIN TIME: CPT | Performed by: PHYSICIAN ASSISTANT

## 2017-09-01 PROCEDURE — 82140 ASSAY OF AMMONIA: CPT | Performed by: PHYSICIAN ASSISTANT

## 2017-09-01 PROCEDURE — A9576 INJ PROHANCE MULTIPACK: HCPCS | Performed by: RADIOLOGY

## 2017-09-01 PROCEDURE — 74011250636 HC RX REV CODE- 250/636

## 2017-09-01 PROCEDURE — 83010 ASSAY OF HAPTOGLOBIN QUANT: CPT | Performed by: EMERGENCY MEDICINE

## 2017-09-01 PROCEDURE — 82248 BILIRUBIN DIRECT: CPT | Performed by: EMERGENCY MEDICINE

## 2017-09-01 PROCEDURE — 83605 ASSAY OF LACTIC ACID: CPT | Performed by: PHYSICIAN ASSISTANT

## 2017-09-01 PROCEDURE — 65270000029 HC RM PRIVATE

## 2017-09-01 PROCEDURE — 51701 INSERT BLADDER CATHETER: CPT

## 2017-09-01 PROCEDURE — 80053 COMPREHEN METABOLIC PANEL: CPT | Performed by: INTERNAL MEDICINE

## 2017-09-01 PROCEDURE — 83615 LACTATE (LD) (LDH) ENZYME: CPT | Performed by: EMERGENCY MEDICINE

## 2017-09-01 PROCEDURE — 74177 CT ABD & PELVIS W/CONTRAST: CPT

## 2017-09-01 PROCEDURE — 74011250636 HC RX REV CODE- 250/636: Performed by: RADIOLOGY

## 2017-09-01 PROCEDURE — 74011250636 HC RX REV CODE- 250/636: Performed by: PHYSICIAN ASSISTANT

## 2017-09-01 PROCEDURE — 96361 HYDRATE IV INFUSION ADD-ON: CPT

## 2017-09-01 PROCEDURE — 99285 EMERGENCY DEPT VISIT HI MDM: CPT

## 2017-09-01 PROCEDURE — 93005 ELECTROCARDIOGRAM TRACING: CPT

## 2017-09-01 PROCEDURE — 74011000250 HC RX REV CODE- 250: Performed by: INTERNAL MEDICINE

## 2017-09-01 PROCEDURE — 83690 ASSAY OF LIPASE: CPT | Performed by: PHYSICIAN ASSISTANT

## 2017-09-01 PROCEDURE — 74011250636 HC RX REV CODE- 250/636: Performed by: INTERNAL MEDICINE

## 2017-09-01 PROCEDURE — 83735 ASSAY OF MAGNESIUM: CPT | Performed by: PHYSICIAN ASSISTANT

## 2017-09-01 PROCEDURE — 70553 MRI BRAIN STEM W/O & W/DYE: CPT

## 2017-09-01 PROCEDURE — 71020 XR CHEST PA LAT: CPT

## 2017-09-01 PROCEDURE — 77030005563 HC CATH URETH INT MMGH -A

## 2017-09-01 PROCEDURE — 74011636320 HC RX REV CODE- 636/320: Performed by: RADIOLOGY

## 2017-09-01 RX ORDER — ONDANSETRON 2 MG/ML
4 INJECTION INTRAMUSCULAR; INTRAVENOUS
Status: DISCONTINUED | OUTPATIENT
Start: 2017-09-01 | End: 2017-09-04 | Stop reason: HOSPADM

## 2017-09-01 RX ORDER — SODIUM CHLORIDE 9 MG/ML
100 INJECTION, SOLUTION INTRAVENOUS CONTINUOUS
Status: DISCONTINUED | OUTPATIENT
Start: 2017-09-01 | End: 2017-09-04 | Stop reason: HOSPADM

## 2017-09-01 RX ORDER — ENOXAPARIN SODIUM 100 MG/ML
40 INJECTION SUBCUTANEOUS EVERY 24 HOURS
Status: DISCONTINUED | OUTPATIENT
Start: 2017-09-01 | End: 2017-09-04 | Stop reason: HOSPADM

## 2017-09-01 RX ORDER — ONDANSETRON 4 MG/1
4 TABLET, ORALLY DISINTEGRATING ORAL
Status: ON HOLD | COMMUNITY
End: 2017-09-04

## 2017-09-01 RX ORDER — SODIUM CHLORIDE 0.9 % (FLUSH) 0.9 %
5-10 SYRINGE (ML) INJECTION EVERY 8 HOURS
Status: DISCONTINUED | OUTPATIENT
Start: 2017-09-01 | End: 2017-09-04 | Stop reason: HOSPADM

## 2017-09-01 RX ORDER — DIPHENHYDRAMINE HCL 25 MG
25 CAPSULE ORAL
Status: DISCONTINUED | OUTPATIENT
Start: 2017-09-01 | End: 2017-09-04 | Stop reason: HOSPADM

## 2017-09-01 RX ORDER — SODIUM CHLORIDE 0.9 % (FLUSH) 0.9 %
5-10 SYRINGE (ML) INJECTION AS NEEDED
Status: DISCONTINUED | OUTPATIENT
Start: 2017-09-01 | End: 2017-09-04 | Stop reason: HOSPADM

## 2017-09-01 RX ORDER — ERYTHROMYCIN 5 MG/G
OINTMENT OPHTHALMIC 2 TIMES DAILY
COMMUNITY

## 2017-09-01 RX ORDER — PHENOL/SODIUM PHENOLATE
20 AEROSOL, SPRAY (ML) MUCOUS MEMBRANE DAILY
COMMUNITY

## 2017-09-01 RX ORDER — PROCHLORPERAZINE EDISYLATE 5 MG/ML
10 INJECTION INTRAMUSCULAR; INTRAVENOUS
Status: COMPLETED | OUTPATIENT
Start: 2017-09-01 | End: 2017-09-01

## 2017-09-01 RX ORDER — ERYTHROMYCIN 5 MG/G
OINTMENT OPHTHALMIC 2 TIMES DAILY
Status: DISCONTINUED | OUTPATIENT
Start: 2017-09-01 | End: 2017-09-04 | Stop reason: HOSPADM

## 2017-09-01 RX ORDER — PROCHLORPERAZINE EDISYLATE 5 MG/ML
INJECTION INTRAMUSCULAR; INTRAVENOUS
Status: COMPLETED
Start: 2017-09-01 | End: 2017-09-01

## 2017-09-01 RX ORDER — MECLIZINE HYDROCHLORIDE 25 MG/1
25 TABLET ORAL
Status: ON HOLD | COMMUNITY
End: 2017-09-04

## 2017-09-01 RX ADMIN — IOPAMIDOL 100 ML: 755 INJECTION, SOLUTION INTRAVENOUS at 17:28

## 2017-09-01 RX ADMIN — DIPHENHYDRAMINE HYDROCHLORIDE 25 MG: 25 CAPSULE ORAL at 22:14

## 2017-09-01 RX ADMIN — SODIUM CHLORIDE 125 ML/HR: 900 INJECTION, SOLUTION INTRAVENOUS at 21:42

## 2017-09-01 RX ADMIN — FAMOTIDINE 20 MG: 10 INJECTION INTRAVENOUS at 21:42

## 2017-09-01 RX ADMIN — ENOXAPARIN SODIUM 40 MG: 40 INJECTION SUBCUTANEOUS at 21:42

## 2017-09-01 RX ADMIN — ERYTHROMYCIN: 5 OINTMENT OPHTHALMIC at 21:45

## 2017-09-01 RX ADMIN — PROCHLORPERAZINE EDISYLATE 10 MG: 5 INJECTION INTRAMUSCULAR; INTRAVENOUS at 14:45

## 2017-09-01 RX ADMIN — SODIUM CHLORIDE 1000 ML: 900 INJECTION, SOLUTION INTRAVENOUS at 16:09

## 2017-09-01 RX ADMIN — Medication 10 ML: at 21:42

## 2017-09-01 RX ADMIN — SODIUM CHLORIDE 1000 ML: 900 INJECTION, SOLUTION INTRAVENOUS at 14:45

## 2017-09-01 RX ADMIN — GADOTERIDOL 12 ML: 279.3 INJECTION, SOLUTION INTRAVENOUS at 22:00

## 2017-09-01 NOTE — ED NOTES
Nausea resolved, left flank pain returned, worse when patient is sitting up. Patient leaned back and positioned to comfort. 2nd IVF bag infusing per Miles HOLLAND.

## 2017-09-01 NOTE — H&P
Admission History and Physical      NAME:  Joan Osuna   :   1965   MRN:  [id number]     PCP:  Whitney Blankenship MD     Date/Time:  2017           Assessment/Plan:       Intractable nausea and vomiting (2017): Unclear etiology. Symptoms ongoing for 3 weeks. 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 Surgery called by ED provider and recommended GI evaluation but no need for surgery at this time. Lipase minimally elevated, but more likely from vomiting. No abdominal ttp by exam.  Feels a bit dizzy but no vision changes. No HA or neck stiffness to suspect central etiology for n/v.     -- check abdominal US   -- antiemetics prn   -- IVF hydration   -- check mri brain to eval for central causes of n/v    Leukocytosis (2017): May be from N/V.  UA w/o infectious findings. -- check CXR   -- repeat labs in AM    Hyperbilirubinemia (2017):  Hemolyzed sample however. CT w/o biliary dilatation or masses. -- repeat CMP   -- check ldh, hapto, fractionated bili    Hyponatremia (2017):  Likely from vomiting.   -- hydrate with NS   -- monitor sodium    Acute renal insufficiency (2017):  Likely from n/v and intravascular volume depletion. -- hydrate with NS   -- trend creatinine and bun    Herpes zoster virus infection of face and ear nerves ():  Was followed and monitored by ENT. Was associated with right facial weakness due to facial nerve involvement. Much improved now from initial per patient and father. -- check CT head    Polycythemia:  Likely from IVVD. -- hydrate with IVF   -- repeat HGB in AM         Subjective:     CHIEF COMPLAINT:  N/V    HISTORY OF PRESENT ILLNESS:     Mr. Moraima Aguilar is a 47 y.o.  male who is admitted with Intractable nausea and vomiting. Mr. Moraima Aguilar presented to the Emergency Department today complaining of nausea and vomiting. Symptoms present for 3 weeks. Worse with trying to eat or drink. No fever or chills. Denies abd pains.   Denies diarrhea. No sick contacts. Denies HA. No vision change. No photophobia or neck stiffness. Past Medical History:   Diagnosis Date    H/O scarlet fever     Herpes zoster virus infection of face and ear nerves     8/2017: With facial weakness on right.  Ill-defined condition     spinal contusion    Ill-defined condition     broken bone in left knee        Past Surgical History:   Procedure Laterality Date    HX OTHER SURGICAL      surgery on neck    HX UROLOGICAL Right     surgery/biopsy on right kidney       Social History   Substance Use Topics    Smoking status: Never Smoker    Smokeless tobacco: Never Used    Alcohol use No        Family History   Problem Relation Age of Onset    Hypertension Father         No Known Allergies     Prior to Admission medications    Medication Sig Start Date End Date Taking? Authorizing Provider   Omeprazole delayed release (PRILOSEC D/R) 20 mg tablet Take 20 mg by mouth daily. Yes Phys Other, MD   ondansetron (ZOFRAN ODT) 4 mg disintegrating tablet Take 4 mg by mouth every eight (8) hours as needed for Nausea. Yes Phys Other, MD   erythromycin (ILOTYCIN) ophthalmic ointment Administer  to right eye two (2) times a day. Yes Historical Provider   meclizine (ANTIVERT) 25 mg tablet Take 25 mg by mouth three (3) times daily as needed.    Yes Historical Provider         Review of Systems:  (bold if positive, if negative)    Gen:  Eyes:  ENT:  CVS:  Pulm:  GI:  nausea, emesis  :    MS:  Skin:  Psych:  Endo:    Hem:  Renal:    Neuro:  weakness          Objective:      VITALS:    Vital signs reviewed; most recent are:    Visit Vitals    /76    Pulse 69    Temp 98.9 °F (37.2 °C)    Resp 23    Ht 5' 9\" (1.753 m)    Wt 64.4 kg (142 lb)    SpO2 96%    BMI 20.97 kg/m2     SpO2 Readings from Last 6 Encounters:   09/01/17 96%   08/13/17 99%   08/12/17 98%   01/14/16 99%          Intake/Output Summary (Last 24 hours) at 09/01/17 1931  Last data filed at 09/01/17 1600   Gross per 24 hour   Intake                0 ml   Output              250 ml   Net             -250 ml            Exam:     Physical Exam:    Gen:  Well-developed, well-nourished, in no acute distress  HEENT:  Icteric conjunctivae, PERRL, hearing intact to voice, moist mucous membranes, mild edema of right earlobe, TM clear  Neck:  Supple  Resp:  No accessory muscle use, clear breath sounds without wheezes rales or rhonchi  Card:  No murmurs, normal S1, S2 without thrills or peripheral edema  Abd:  Soft, non-tender, non-distended, normoactive bowel sounds are present  Musc:  No cyanosis  Skin:  No rashes, skin turgor is good  Neuro:  Mild right facial weakness with eyes closing and smile,  strength is 5/5 bilaterally, dorsi / plantarflexion strength is 5/5 bilaterally, follows commands appropriately  Psych:  Alert with good insight. Oriented to person, place, and time       Labs:    Recent Labs      09/01/17   1445   WBC  13.5*   HGB  18.0*   HCT  50.0   PLT  269     Recent Labs      09/01/17   1445   NA  132*   K  4.8   CL  100   CO2  21   GLU  114*   BUN  23*   CREA  1.08   CA  9.4   MG  2.3   ALB  3.7   TBILI  7.2*   SGOT  36   ALT  57     Lab Results   Component Value Date/Time    Glucose (POC) 119 08/13/2017 09:48 AM     No results for input(s): PH, PCO2, PO2, HCO3, FIO2 in the last 72 hours. Recent Labs      09/01/17   1445   INR  1.1     EKG reviewed:   Sinus rhythm. No acute ischemic st/t wave changes. Medical records reviewed in preparation for this admission: Old medical records.     Total time spent with patient: [address] [place] [address] discussed with: Patient and Family    Discussed:  Care Plan    Prophylaxis:  Lovenox    Probable Disposition:  Home w/Family           ___________________________________________________    Attending Physician: Mitul Jefferson MD

## 2017-09-01 NOTE — ED NOTES
Patient awaiting CT, nausea continues to be improved, left flank pain improved. Resting on the stretcher.

## 2017-09-01 NOTE — PROGRESS NOTES
[facility]: MED RECONCILIATION    Comments/Recommendations:     Pt denies ever taking metoprolol, atorvastatin,or aspirin even though showing up in RxQuery program (pt did not recognize physician name and denied being in [place] recently). Confirmed pt's name, address, , phone number. Also denies taking hydrocodone/apap- prescribed #20 on 17. Some medications in RxQuery appear to be correct and recently prescribed during recent ED visits    Pt indicates he completed Prednisone 10mg TID x 10 days the previous weekend. He also reports completing tx with Ciprodex ear drops. Pt denies taking anything specifically for shingles. RxQuery does not show any acyclovir or valtrex either (or any other antiviral medication)      Augmentin  - completed medication x 10days. Prescribed 17 (was already prescribed amoxicillin from previous day). Re-confirmed with patient that he did take and completed medication, which he affirmed    Per NIH, Liver tox website:      -implicated in hundreds of cases of clinically apparent acute liver injury and this combination is currently the most common cause of drug induced liver disease in most large case series from the [place] and [place]    -The onset of injury is typically a few days to as long as 8 weeks (average ~3 weeks) after initiation of therapy and often occurs after the course of antibiotic is completed, the delay being a few days to as long as six weeks. The onset is typically with fatigue, low grade fever, nausea and abdominal pain, followed by pruritus and jaundice. The pattern of liver enzyme elevations is typically cholestatic with marked elevations in alkaline phosphatase and gamma glutamyl transpeptidase. In some instances, aminotransferase levels are markedly elevated giving a mixed (Case 2) or hepatocellular pattern (Case 3), particularly in younger patients with earlier onset of injury.  Because the liver injury may present days or weeks after stopping therapy.    -The cause of amoxicillin-clavulanate hepatotoxicity is unknown, but is probably immunoallergic in origin. Allergic manifestations can occur and include rash, fever, arthralgias and eosinophilia. Several studies have reported an HLA Class II association with DRB1*15:01 and the extended haplotype DRB1*15:01-DRB1*01:01-DQB1*06:02. An independent HLA Class I association has also been made with HLA-A*02:01. The liver injury appears to be due to the clavulanate rather than amoxicillin    -The liver injury caused by amoxicillin-clavulanate is typically associated with jaundice and can be severe and prolonged (with jaundice lasting 4 to 24 weeks), but rarely results in lasting injury or death. Deaths due to amoxicillin-clavulanate hepatic injury have been described, but largely in patients with other comorbidities including cirrhosis or with multiple exposures. In addition, rare instances of prolonged cholestasis and vanishing bile duct syndrome have been reported after acute amoxicillin-clavulanate injury. Corticosteroids have been used in patients with marked or prolonged cholestasis due to amoxicillin/clavulanate, but their efficacy has not been shown and their use cannot be recommended routinely. Cholestyramine or ursodiol may help alleviate symptoms but probably do not speed recovery. Rechallenge with amoxicillin-clavulanate results in recurrence and should be avoided    Ciprofloxacin  - reviewed again with pt after initial interview. Pt state he never took this medication. Rarely associated w/ hepatoxicity    - d/w ED provider and admitting MD    Information obtained from: Patient, [address] [place] [address], recent medical records      Allergies: Review of patient's allergies indicates no known allergies. Prior to Admission Medications:     Medication Documentation Review Audit       Reviewed by Pete Lopez.  Alma Merino (Pharmacist) on 09/01/17 at [address] [place] [address] Provider Last Dose Status Taking?      erythromycin (ILOTYCIN) ophthalmic ointment Administer  to right eye two (2) times a day. Historical Provider 9/1/2017 AM Active Yes    meclizine (ANTIVERT) 25 mg tablet Take 25 mg by mouth three (3) times daily as needed. Historical Provider  Active Yes    Omeprazole delayed release (PRILOSEC D/R) 20 mg tablet Take 20 mg by mouth daily. Rajat Griffin MD  Active Yes    ondansetron (ZOFRAN ODT) 4 mg disintegrating tablet Take 4 mg by mouth every eight (8) hours as needed for Nausea. Rajat Griffin MD  Active Yes                  Thank you,  Javi Bull, Pharm. D.

## 2017-09-01 NOTE — ED NOTES
Patient unable to give a urine sample at this time. IVF infusing, no active vomiting. Will re-check on patient in 30 minutes and see if he is able to go to the bathroom. [place] provided and positioned for comfort.

## 2017-09-01 NOTE — ED PROVIDER NOTES
HPI Comments: Guillermo Whittington is a 47 y.o. male who presents with his brother to the ED with a c/o progressively getting sicker x 3 weeks. Pt notes he has lost 20-25 lbs x 3 weeks. He started with a right ear shingles infection that progressed to right facial nerve palsy. He was not eating well secondary to pain initially. Over the last week he has been nauseated with >20 episodes of vomiting. He notes worsening of the left flank pain that he's had > 1 year. His brother notes pt has turned more yellow in appearance over the last 4-5 days, as well as having dark colored urine. Pt denies RUQ pain. He specifically denies any fevers, chills, nausea, vomiting, chest pain, dysuria, hematuria, shortness of breath, headache, rash, diarrhea, sweating or weight loss. PCP: Ramy Varghese MD  PMHx significant for: Past Medical History:  No date: H/O scarlet fever  No date: Ill-defined condition      Comment: spinal contusion  No date: Ill-defined condition      Comment: broken bone in left knee  PSHx significant for: .,Past Surgical History:  No date: HX OTHER SURGICAL      Comment: surgery on neck  No date: HX UROLOGICAL Right      Comment: surgery/biopsy on right kidney  Social Hx: Tobacco: denies  EtOH: denies Illicit drug use: denies    There are no further complaints or symptoms at this time. The history is provided by the patient and a relative (brother). Past Medical History:   Diagnosis Date    H/O scarlet fever     Ill-defined condition     spinal contusion    Ill-defined condition     broken bone in left knee       Past Surgical History:   Procedure Laterality Date    HX OTHER SURGICAL      surgery on neck    HX UROLOGICAL Right     surgery/biopsy on right kidney         History reviewed. No pertinent family history. Social History     Social History    Marital status: SINGLE     Spouse name: N/A    Number of children: N/A    Years of education: N/A     Occupational History    Not on file. Social History Main Topics    Smoking status: Never Smoker    Smokeless tobacco: Never Used    Alcohol use No    Drug use: No    Sexual activity: Not on file     Other Topics Concern    Not on file     Social History Narrative         ALLERGIES: Review of patient's allergies indicates no known allergies. Review of Systems   Constitutional: Negative for chills and fever. HENT: Negative for congestion, rhinorrhea, sneezing and sore throat. Eyes: Negative for redness and visual disturbance. Respiratory: Negative for shortness of breath. Cardiovascular: Negative for chest pain and leg swelling. Gastrointestinal: Positive for abdominal pain, nausea and vomiting. Genitourinary: Negative for difficulty urinating and frequency. Musculoskeletal: Negative for back pain, myalgias and neck stiffness. Skin: Positive for color change. Negative for rash. Neurological: Negative for dizziness, syncope, weakness and headaches. Hematological: Negative for adenopathy. Patient Vitals for the past 12 hrs:   Temp Pulse Resp BP SpO2   09/01/17 1830 - 67 11 132/61 98 %   09/01/17 1800 - 73 14 116/61 98 %   09/01/17 1700 - 62 16 121/66 100 %   09/01/17 1630 - 66 14 117/77 97 %   09/01/17 1612 - 71 18 122/84 100 %   09/01/17 1600 - 73 15 (!) 142/116 99 %   09/01/17 1530 - 69 20 132/90 99 %   09/01/17 1500 - 75 21 (!) 131/100 100 %   09/01/17 1406 98.9 °F (37.2 °C) 85 26 (!) 147/95 99 %              Physical Exam   Constitutional: He is oriented to person, place, and time. He appears well-developed and well-nourished. No distress. Ill appearing, jaundiced   HENT:   Head: Normocephalic and atraumatic. Left Ear: External ear normal.   Nose: Nose normal.   Mm dry, right external ear with some swelling and resolving erythema and scabbed wound   Eyes: EOM are normal. Pupils are equal, round, and reactive to light. Right eye exhibits no discharge. Left eye exhibits no discharge. No scleral icterus. Neck: Neck supple. Cardiovascular: Normal rate, regular rhythm, normal heart sounds and intact distal pulses. Exam reveals no gallop and no friction rub. No murmur heard. Pulmonary/Chest: Effort normal and breath sounds normal. No stridor. No respiratory distress. He has no wheezes. He has no rales. He exhibits no tenderness. Abdominal: Soft. Bowel sounds are normal. He exhibits no distension and no mass. There is tenderness. There is no rebound and no guarding. luq TTP without rebounding or guarding no cvat   Musculoskeletal: Normal range of motion. He exhibits no edema, tenderness or deformity. Neurological: He is alert and oriented to person, place, and time. No cranial nerve deficit. Coordination normal.   Skin: No rash noted. No erythema. No pallor. Psychiatric: He has a normal mood and affect. His behavior is normal.   Nursing note and vitals reviewed. MDM  Number of Diagnoses or Management Options  Jaundice:   Nausea and vomiting in adult:   Weight loss, non-intentional:      Amount and/or Complexity of Data Reviewed  Clinical lab tests: reviewed and ordered  Tests in the radiology section of CPT®: ordered and reviewed  Tests in the medicine section of CPT®: ordered and reviewed  Obtain history from someone other than the patient: yes (brother)  Review and summarize past medical records: yes  Independent visualization of images, tracings, or specimens: yes    Patient Progress  Patient progress: stable    ED Course       Procedures    2:23 PM  Discussed pt, sx, hx and current findings with Dr Donna Galeano. He is in agreement with plan and will see pt  Veronique Mooney. TYRESE Baltazar      ED EKG interpretation: 2:31 PM  Rhythm: normal sinus rhythm, incomplete RBBB; and regular . Rate (approx.): 75; Axis: normal; P wave: normal; QRS interval: normal ; ST/T wave: normal; Other findings: borderline EKG. This EKG was interpreted by Andree Diamond MD,ED Provider. 6:11 PM  Veronique Mooney.  TYRESE Baltazar spoke with Dr. Cody Barton, Consult for Hospitalist. Discussed available diagnostic tests and clinical findings. He is in agreement with care plans as outlined. He recommends calling surgery in the absence of other liver/ gb findings. AMALIA Akers    6:28 PM  Dell Herndon. TYRESE Baltazar spoke with Dr. Karli Hutson, Consult for Surgery. Discussed available diagnostic tests and clinical findings. She is in agreement with care plans as outlined. She notes she does not feel this is surgical. She recommends admission to the hospitalist with further investigation of pt's jaundice with GI. She will be happy to consult. AMALIA Akers    6:34 PM  Dr Cody Barton updated on consult with Dr Karli Hutson. He will admit pt   Dell Herndon. TYRESE Baltazar      LABS COMPLETED AND REVIEWED:  Recent Results (from the past 12 hour(s))   EKG, 12 LEAD, INITIAL    Collection Time: 09/01/17  2:31 PM   Result Value Ref Range    Ventricular Rate 75 BPM    Atrial Rate 75 BPM    P-R Interval 134 ms    QRS Duration 94 ms    Q-T Interval 410 ms    QTC Calculation (Bezet) 457 ms    Calculated P Axis 63 degrees    Calculated R Axis -17 degrees    Calculated T Axis 13 degrees    Diagnosis       Normal sinus rhythm  Incomplete right bundle branch block  Borderline ECG  No previous ECGs available     CBC WITH AUTOMATED DIFF    Collection Time: 09/01/17  2:45 PM   Result Value Ref Range    WBC 13.5 (H) 4.1 - 11.1 K/uL    RBC 5.94 (H) 4.10 - 5.70 M/uL    HGB 18.0 (H) 12.1 - 17.0 g/dL    HCT 50.0 36.6 - 50.3 %    MCV 84.2 80.0 - 99.0 FL    MCH 30.3 26.0 - 34.0 PG    MCHC 36.0 30.0 - 36.5 g/dL    RDW 13.5 11.5 - 14.5 %    PLATELET 115 138 - 148 K/uL    NEUTROPHILS 68 32 - 75 %    LYMPHOCYTES 20 12 - 49 %    MONOCYTES 11 5 - 13 %    EOSINOPHILS 1 0 - 7 %    BASOPHILS 0 0 - 1 %    ABS. NEUTROPHILS 9.2 (H) 1.8 - 8.0 K/UL    ABS. LYMPHOCYTES 2.7 0.8 - 3.5 K/UL    ABS. MONOCYTES 1.5 (H) 0.0 - 1.0 K/UL    ABS. EOSINOPHILS 0.1 0.0 - 0.4 K/UL    ABS.  BASOPHILS 0.0 0.0 - 0.1 K/UL   METABOLIC PANEL, COMPREHENSIVE    Collection Time: 09/01/17  2:45 PM   Result Value Ref Range    Sodium 132 (L) 136 - 145 mmol/L    Potassium 4.8 3.5 - 5.1 mmol/L    Chloride 100 97 - 108 mmol/L    CO2 21 21 - 32 mmol/L    Anion gap 11 5 - 15 mmol/L    Glucose 114 (H) 65 - 100 mg/dL    BUN 23 (H) 6 - 20 MG/DL    Creatinine 1.08 0.70 - 1.30 MG/DL    BUN/Creatinine ratio 21 (H) 12 - 20      GFR est AA >60 >60 ml/min/1.73m2    GFR est non-AA >60 >60 ml/min/1.73m2    Calcium 9.4 8.5 - 10.1 MG/DL    Bilirubin, total 7.2 (H) 0.2 - 1.0 MG/DL    ALT (SGPT) 57 12 - 78 U/L    AST (SGOT) 36 15 - 37 U/L    Alk.  phosphatase 90 45 - 117 U/L    Protein, total 7.5 6.4 - 8.2 g/dL    Albumin 3.7 3.5 - 5.0 g/dL    Globulin 3.8 2.0 - 4.0 g/dL    A-G Ratio 1.0 (L) 1.1 - 2.2     MAGNESIUM    Collection Time: 09/01/17  2:45 PM   Result Value Ref Range    Magnesium 2.3 1.6 - 2.4 mg/dL   LIPASE    Collection Time: 09/01/17  2:45 PM   Result Value Ref Range    Lipase 395 (H) 73 - 393 U/L   TROPONIN I    Collection Time: 09/01/17  2:45 PM   Result Value Ref Range    Troponin-I, Qt. <0.04 <0.05 ng/mL   AMMONIA    Collection Time: 09/01/17  2:45 PM   Result Value Ref Range    Ammonia 18 <32 UMOL/L   PROTHROMBIN TIME + INR    Collection Time: 09/01/17  2:45 PM   Result Value Ref Range    INR 1.1 0.9 - 1.1      Prothrombin time 11.4 (H) 9.0 - 11.1 sec   LACTIC ACID    Collection Time: 09/01/17  2:45 PM   Result Value Ref Range    Lactic acid 1.9 0.4 - 2.0 MMOL/L   URINALYSIS W/ RFLX MICROSCOPIC    Collection Time: 09/01/17  4:16 PM   Result Value Ref Range    Color DARK YELLOW      Appearance CLOUDY (A) CLEAR      Specific gravity 1.025 1.003 - 1.030      pH (UA) 7.0 5.0 - 8.0      Protein TRACE (A) NEG mg/dL    Glucose NEGATIVE  NEG mg/dL    Ketone 40 (A) NEG mg/dL    Bilirubin NEGATIVE  NEG      Blood NEGATIVE  NEG      Urobilinogen 1.0 0.2 - 1.0 EU/dL    Nitrites NEGATIVE  NEG      Leukocyte Esterase NEGATIVE  NEG      WBC 0-4 0 - 4 /hpf    RBC 0-5 0 - 5 /hpf    Epithelial cells FEW FEW /lpf    Bacteria NEGATIVE  NEG /hpf    Amorphous Crystals FEW (A) NEG      Granular cast 2-5 (A) NEG /lpf       IMAGING COMPLETED AND REVIEWED:  The following have been ordered and reviewed:    Ct Abd Pelv W Cont    Result Date: 9/1/2017  EXAM:  CT ABD PELV W CONT INDICATION: jaundice, n/v  left flank pain since Tuesday. Dark urine. COMPARISON: 1/14/2016 CONTRAST:  95 mL of Isovue-370. TECHNIQUE: Following the uneventful intravenous administration of contrast, thin axial images were obtained through the abdomen and pelvis. Coronal and sagittal reconstructions were generated. Oral contrast was not administered. CT dose reduction was achieved through use of a standardized protocol tailored for this examination and automatic exposure control for dose modulation. FINDINGS: LUNG BASES: Clear. INCIDENTALLY IMAGED HEART AND MEDIASTINUM: Unremarkable. LIVER: No mass or biliary dilatation. GALLBLADDER: Possible mild calcification in the gallbladder wall. SPLEEN: No mass. PANCREAS: No mass or ductal dilatation. ADRENALS: Unremarkable. KIDNEYS: Bilateral renal cysts and nonobstructing intrarenal calculi. No hydronephrosis or ureteronephrosis. No solid mass. STOMACH: Unremarkable. SMALL BOWEL: No dilatation or wall thickening. COLON: No dilatation or wall thickening. APPENDIX: Not well seen, but no acute appendiceal abnormality is suspected. PERITONEUM: No ascites or pneumoperitoneum. RETROPERITONEUM: No lymphadenopathy or aortic aneurysm. REPRODUCTIVE ORGANS: Prostatic enlargement with calcification. URINARY BLADDER: The urinary bladder is well distended with calcification layering in the left posterior lateral bladder lumen. BONES: No destructive bone lesion. ADDITIONAL COMMENTS: N/A     IMPRESSION: 1. Bilateral nonobstructing intrarenal calculi with no hydroureteronephrosis, but calculi layered dependently in the bladder lumen. 2. Possible developing porcelain gallbladder.  3. Other incidental findings. MEDICATIONS GIVEN:  Medications   sodium chloride 0.9 % bolus infusion 1,000 mL (0 mL IntraVENous IV Completed 9/1/17 1554)   prochlorperazine (COMPAZINE) injection 10 mg (10 mg IntraVENous Given 9/1/17 1445)   sodium chloride 0.9 % bolus infusion 1,000 mL (0 mL IntraVENous IV Completed 9/1/17 1710)   iopamidol (ISOVUE-370) 76 % injection 100 mL (100 mL IntraVENous Given 9/1/17 1728)         CLINICAL IMPRESSION:  1. Jaundice    2. Nausea and vomiting in adult    3. Weight loss, non-intentional    4. Porcelain gallbladder          Plan  1. Admission per Hospitalist      6:43 PM  The patient is being admitted to the hospital.  The results of their tests and reasons for their admission have been discussed with them and/or available family. The patient/family has conveyed agreement and understanding for the need to be admitted and for their admission diagnosis. Consultation has been made with the inpatient physician specialist for hospitalization.

## 2017-09-01 NOTE — IP AVS SNAPSHOT
Hyacinth Ventura 
 
 
 [address] [place] [address] [address] [facility] 
[phone] Patient: Maddie Sanz MRN: [id number] :1965 You are allergic to the following No active allergies Recent Documentation Height Weight BMI Smoking Status 1.753 m 64.4 kg 20.97 kg/m2 Never Smoker Emergency Contacts Name Discharge Info Relation Home Work Mobile Sharon Steele CAREGIVER [3] Brother [24]   [phone] Tamy Huggins CAREGIVER [3] Father [15] [phone] About your hospitalization You were admitted on:  2017 You last received care in the:  [facility] 5M1 MED SURG 1 You were discharged on:  2017 Unit phone number:  [phone] Why you were hospitalized Your primary diagnosis was:  [place]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) Your diagnoses also included:  Intractable Nausea And Vomiting, Leukocytosis, Hyperbilirubinemia, Hyponatremia, Acute Renal Insufficiency, Herpes Zoster Virus Infection Of Face And Ear Nerves, Bell's Palsy Providers Seen During Your Hospitalizations Provider Role Specialty Primary office phone Macie Black MD Attending Provider Emergency Medicine [phone] Aaliyah Silva MD Attending Provider Internal Medicine [phone] Eugene Martinez DO Attending Provider Internal Medicine [phone] Jose Dozier MD Attending Provider Internal Medicine [phone] Your Primary Care Physician (PCP) Primary Care Physician Office Phone Office Fax David Zoie [phone] Follow-up Information Follow up With Details Comments Contact Info Vianca Coreas MD Schedule an appointment as soon as possible for a visit in 1 week  [address] [facility] [address] [facility] 
[phone]  Leigh Ann Murguia MD Schedule an appointment as soon as possible for a visit in 2 weeks  [place] [address] [facility] [address] 
[phone] Renay Quintero MD Schedule an appointment as soon as possible for a visit in 2 weeks for calcified gallbladder [address] 100[address] [facility] 
[phone] Current Discharge Medication List  
  
START taking these medications Dose & Instructions Dispensing Information Comments Morning Noon Evening Bedtime  
 predniSONE 10 mg tablet Commonly known as:  Jefm Canter Your last dose was: Your next dose is: Take 40mg (4 tabs) daily for 3 days, then 20mg (2 tabs) daily for 3 days, then 10mg daily for 3 days Quantity:  21 Tab Refills:  0  
     
   
   
   
  
 promethazine 12.5 mg tablet Commonly known as:  PHENERGAN Your last dose was: Your next dose is:    
   
   
 Dose:  12.5 mg Take 1 Tab by mouth every six (6) hours as needed for Nausea. Quantity:  20 Tab Refills:  0 CONTINUE these medications which have CHANGED Dose & Instructions Dispensing Information Comments Morning Noon Evening Bedtime  
 meclizine 25 mg tablet Commonly known as:  ANTIVERT What changed:   
- when to take this 
- reasons to take this Your last dose was: Your next dose is:    
   
   
 Dose:  25 mg Take 1 Tab by mouth two (2) times a day. Quantity:  30 Tab Refills:  0 CONTINUE these medications which have NOT CHANGED Dose & Instructions Dispensing Information Comments Morning Noon Evening Bedtime  
 erythromycin ophthalmic ointment Commonly known as:  ILOTYCIN Your last dose was: Your next dose is:    
   
   
 Administer  to right eye two (2) times a day. Refills:  0 Omeprazole delayed release 20 mg tablet Commonly known as:  PRILOSEC D/R Your last dose was: Your next dose is: Dose:  20 mg Take 20 mg by mouth daily. Refills:  0  
     
   
   
   
  
 ondansetron 4 mg disintegrating tablet Commonly known as:  ZOFRAN ODT Your last dose was: Your next dose is:    
   
   
 Dose:  4 mg Take 1 Tab by mouth every eight (8) hours as needed for Nausea. Quantity:  20 Tab Refills:  0 Where to Get Your Medications Information on where to get these meds will be given to you by the nurse or doctor. ! Ask your nurse or doctor about these medications  
  meclizine 25 mg tablet  
 ondansetron 4 mg disintegrating tablet  
 predniSONE 10 mg tablet  
 promethazine 12.5 mg tablet Discharge Instructions HOSPITALIST DISCHARGE INSTRUCTIONS 
NAME: Balbina Hanna :  1965 MRN:  [id number] Date/Time:  2017 10:44 AM 
 
ADMIT DATE: 2017 DISCHARGE DATE: 2017 ADMITTING DIAGNOSIS: 
Right facial weakness, nausea, vomiting, elevated bilirubin DISCHARGE DIAGNOSIS: 
[place] Hunt syndrome, Bell's palsy, Gilbert's disease MEDICATIONS: 
See after visit summary · It is important that you take the medication exactly as they are prescribed. · Keep your medication in the bottles provided by the pharmacist and keep a list of the medication names, dosages, and times to be taken in your wallet. · Do not take other medications without consulting your doctor Pain Management: per above medications What to do at [facility] Recommended diet:  Regular Diet Recommended activity: Activity as tolerated 1) Return to the hospital if you feel worse 2) If you experience any of the following symptoms then please call your primary care physician or return to the emergency room if you cannot get hold of your doctor: 
Fever, chills, nausea, vomiting, diarrhea, change in mentation, falling, bleeding, shortness of breath, chest pain, severe headache, severe abdominal pain,  
 
 3) Take medications as prescribed 4) Follow up with your doctors Follow Up: Follow-up Information Follow up With Details Comments Contact Info Jazlyn Richard MD Schedule an appointment as soon as possible for a visit in 1 week  [address] [facility] [address] 
[phone] Nicholas Valencia MD Schedule an appointment as soon as possible for a visit in 2 weeks  [address] [address] 
[phone] Sheryl Lopez MD Schedule an appointment as soon as possible for a visit in 2 weeks for calcified gallbladder [address] [place] [address] 
[phone] Information obtained by : 
I understand that if any problems occur once I am at home I am to contact my physician. I understand and acknowledge receipt of the instructions indicated above. Physician's or R.N.'s Signature                                                                  Date/Time Patient or Representative Signature                                                          Date/Time Bell's Palsy: Care Instructions Your Care Instructions Bell's palsy is paralysis or weakness of the muscles on one side of the face. Often people with Bell's palsy have a droop on one side of the mouth and have trouble completely shutting the eye on the same side. Bell's palsy can also interfere with the sense of taste. These things happen when a nerve in your face becomes inflamed. Bell's palsy is not caused by a stroke. The cause of the nerve inflammation is not known. But some experts think that a virus may cause it.  Because of this, doctors sometimes prescribe antiviral medicine to treat it. You also may get medicine to reduce swelling. Bell's palsy usually gets better on its own in a few weeks or months. Follow-up care is a key part of your treatment and safety. Be sure to make and go to all appointments, and call your doctor if you are having problems. It's also a good idea to know your test results and keep a list of the medicines you take. How can you care for yourself at home? · Take your medicines exactly as prescribed. Call your doctor if you think you are having a problem with your medicine. You will get more details on the specific medicines your doctor prescribes. · Use artificial tears or ointment if your eyes are too dry. Bell's palsy can make your lower eyelid droop, causing a dry eye. · If you cannot completely close your eye, consider using an eye patch while you sleep. · Help yourself blink by using your finger to close and open your eyelid. This may help keep your eye moist. 
· Wear glasses or goggles to keep dust and dirt out of your eye. · As feeling comes back to your face, massage your forehead, cheeks, and lips. Massage may make the muscles in your face stronger. · Brush and floss your teeth often to help prevent tooth decay. Bell's palsy can dry up the spit on one side of your mouth. This increases the risk of tooth decay. When should you call for help? Call 911 anytime you think you may need emergency care. For example, call if: 
· You have symptoms of a stroke. These may include: 
¨ Sudden numbness, tingling, weakness, or loss of movement in your face, arm, or leg, especially on only one side of your body. ¨ Sudden vision changes. ¨ Sudden trouble speaking. ¨ Sudden confusion or trouble understanding simple statements. ¨ Sudden problems with walking or balance. ¨ A sudden, severe headache that is different from past headaches. Call your doctor now or seek immediate medical care if: · You have numbness or weakness that spreads beyond one side of your face. · You have a skin rash or eye pain or redness, or light bothers your eyes. · You have a new or worse headache. Watch closely for changes in your health, and be sure to contact your doctor if: 
· You do not get better as expected. Where can you learn more? Go to http://abdullahi-gallito.info/. Enter P168 in the search box to learn more about \"Bell's Palsy: Care Instructions. \" Current as of: October 14, 2016 Content Version: 11.3 © 9752-0549 [employer]. Care instructions adapted under license by [employer] (which disclaims liability or warranty for this information). If you have questions about a medical condition or this instruction, always ask your healthcare professional. [address] any warranty or liability for your use of this information. Discharge Orders None [facility] Announcement We are excited to announce that we are making your provider's discharge notes available to you in [facility]. You will see these notes when they are completed and signed by the physician that discharged you from your recent hospital stay. If you have any questions or concerns about any information you see in [facility], please call the Health Information Department where you were seen or reach out to your Primary Care Provider for more information about your plan of care. Introducing [facility]! Justino Cartagena introduces [facility] patient portal. Now you can access parts of your medical record, email your doctor's office, and request medication refills online. 1. In your internet browser, go to [email or web address]/[email or web address] 2. Click on the First Time User? Click Here link in the Sign In box. You will see the New Member Sign Up page. 3. Enter your [facility] Access Code exactly as it appears below.  You will not need to use this code after youve completed the sign-up process. If you do not sign up before the expiration date, you must request a new code. · [employer] Access Code: [email or web address] Expires: 11/10/2017  7:40 AM 
 
4. Enter the last four digits of your Social Security Number (xxxx) and Date of Birth (mm/dd/yyyy) as indicated and click Submit. You will be taken to the next sign-up page. 5. Create a [employer] ID. This will be your [employer] login ID and cannot be changed, so think of one that is secure and easy to remember. 6. Create a [employer] password. You can change your password at any time. 7. Enter your Password Reset Question and Answer. This can be used at a later time if you forget your password. 8. Enter your e-mail address. You will receive e-mail notification when new information is available in [address] 9. Click Sign Up. You can now view and download portions of your medical record. 10. Click the Download Summary menu link to download a portable copy of your medical information. If you have questions, please visit the Frequently Asked Questions section of the [employer] website. Remember, [employer] is NOT to be used for urgent needs. For medical emergencies, dial 911. Now available from your iPhone and Android! General Information Please provide this summary of care documentation to your next provider. Patient Signature:  ____________________________________________________________ Date:  ____________________________________________________________  
  
Karon Cowan Provider Signature:  ____________________________________________________________ Date:  ____________________________________________________________

## 2017-09-01 NOTE — ED NOTES
at bedside, labs to be repeated due to them being hemolyzed. Patient having some nausea, no vomiting.

## 2017-09-01 NOTE — ROUTINE PROCESS
TRANSFER - OUT REPORT:    Verbal report given to Alisha RN(name) on Cleatrice Angelucci  being transferred to 5th floor(unit) for routine progression of care       Report consisted of patients Situation, Background, Assessment and   Recommendations(SBAR). Information from the following report(s) SBAR, Kardex, ED Summary and MAR was reviewed with the receiving nurse. Lines:   Peripheral IV 09/01/17 Left Hand (Active)   Site Assessment Clean, dry, & intact 9/1/2017  2:44 PM   Phlebitis Assessment 0 9/1/2017  2:44 PM   Infiltration Assessment 0 9/1/2017  2:44 PM   Dressing Status Clean, dry, & intact 9/1/2017  2:44 PM   Dressing Type Non-adherent dressing 9/1/2017  2:44 PM   Hub Color/Line Status Pink 9/1/2017  2:44 PM   Action Taken Blood drawn 9/1/2017  2:44 PM       Peripheral IV 09/01/17 Right Hand (Active)   Site Assessment Clean, dry, & intact 9/1/2017  6:58 PM   Phlebitis Assessment 0 9/1/2017  6:58 PM   Infiltration Assessment 0 9/1/2017  6:58 PM   Dressing Status Clean, dry, & intact 9/1/2017  6:58 PM   Dressing Type Non-adherent dressing 9/1/2017  6:58 PM   Hub Color/Line Status Pink 9/1/2017  6:58 PM   Action Taken Blood drawn 9/1/2017  6:58 PM        Opportunity for questions and clarification was provided.       Patient transported with:   [employer]

## 2017-09-02 LAB
ALBUMIN SERPL-MCNC: 2.7 G/DL (ref 3.5–5)
ALBUMIN/GLOB SERPL: 1 {RATIO} (ref 1.1–2.2)
ALP SERPL-CCNC: 62 U/L (ref 45–117)
ALT SERPL-CCNC: 41 U/L (ref 12–78)
AST SERPL-CCNC: 17 U/L (ref 15–37)
BASOPHILS # BLD: 0 K/UL (ref 0–0.1)
BASOPHILS NFR BLD: 0 % (ref 0–1)
BILIRUB DIRECT SERPL-MCNC: 0.2 MG/DL (ref 0–0.2)
BILIRUB DIRECT SERPL-MCNC: 0.3 MG/DL (ref 0–0.2)
BILIRUB INDIRECT SERPL-MCNC: 4.5 MG/DL (ref 0–1.1)
BILIRUB SERPL-MCNC: 4.8 MG/DL (ref 0.2–1)
BILIRUB SERPL-MCNC: 4.8 MG/DL (ref 0.2–1)
EOSINOPHIL # BLD: 0.2 K/UL (ref 0–0.4)
EOSINOPHIL NFR BLD: 2 % (ref 0–7)
ERYTHROCYTE [DISTWIDTH] IN BLOOD BY AUTOMATED COUNT: 13.8 % (ref 11.5–14.5)
GLOBULIN SER CALC-MCNC: 2.6 G/DL (ref 2–4)
HCT VFR BLD AUTO: 38.5 % (ref 36.6–50.3)
HGB BLD-MCNC: 13.6 G/DL (ref 12.1–17)
LYMPHOCYTES # BLD: 2.3 K/UL (ref 0.8–3.5)
LYMPHOCYTES NFR BLD: 28 % (ref 12–49)
MCH RBC QN AUTO: 30 PG (ref 26–34)
MCHC RBC AUTO-ENTMCNC: 35.3 G/DL (ref 30–36.5)
MCV RBC AUTO: 84.8 FL (ref 80–99)
MONOCYTES # BLD: 1 K/UL (ref 0–1)
MONOCYTES NFR BLD: 12 % (ref 5–13)
NEUTS SEG # BLD: 5 K/UL (ref 1.8–8)
NEUTS SEG NFR BLD: 58 % (ref 32–75)
PLATELET # BLD AUTO: 208 K/UL (ref 150–400)
PROT SERPL-MCNC: 5.3 G/DL (ref 6.4–8.2)
RBC # BLD AUTO: 4.54 M/UL (ref 4.1–5.7)
TSH SERPL DL<=0.05 MIU/L-ACNC: 0.51 UIU/ML (ref 0.36–3.74)
WBC # BLD AUTO: 8.4 K/UL (ref 4.1–11.1)

## 2017-09-02 PROCEDURE — 74011250637 HC RX REV CODE- 250/637: Performed by: INTERNAL MEDICINE

## 2017-09-02 PROCEDURE — 84443 ASSAY THYROID STIM HORMONE: CPT | Performed by: INTERNAL MEDICINE

## 2017-09-02 PROCEDURE — 85025 COMPLETE CBC W/AUTO DIFF WBC: CPT | Performed by: INTERNAL MEDICINE

## 2017-09-02 PROCEDURE — 36415 COLL VENOUS BLD VENIPUNCTURE: CPT | Performed by: INTERNAL MEDICINE

## 2017-09-02 PROCEDURE — 82248 BILIRUBIN DIRECT: CPT | Performed by: INTERNAL MEDICINE

## 2017-09-02 PROCEDURE — 65270000029 HC RM PRIVATE

## 2017-09-02 PROCEDURE — 74011250636 HC RX REV CODE- 250/636: Performed by: INTERNAL MEDICINE

## 2017-09-02 PROCEDURE — 74011000250 HC RX REV CODE- 250: Performed by: INTERNAL MEDICINE

## 2017-09-02 PROCEDURE — 80076 HEPATIC FUNCTION PANEL: CPT | Performed by: INTERNAL MEDICINE

## 2017-09-02 PROCEDURE — 74011250636 HC RX REV CODE- 250/636: Performed by: SPECIALIST

## 2017-09-02 RX ORDER — METOCLOPRAMIDE HYDROCHLORIDE 5 MG/ML
5 INJECTION INTRAMUSCULAR; INTRAVENOUS 3 TIMES DAILY
Status: DISCONTINUED | OUTPATIENT
Start: 2017-09-02 | End: 2017-09-04 | Stop reason: HOSPADM

## 2017-09-02 RX ORDER — MECLIZINE HCL 12.5 MG 12.5 MG/1
25 TABLET ORAL 2 TIMES DAILY
Status: DISCONTINUED | OUTPATIENT
Start: 2017-09-02 | End: 2017-09-04 | Stop reason: HOSPADM

## 2017-09-02 RX ADMIN — Medication 10 ML: at 13:48

## 2017-09-02 RX ADMIN — Medication 10 ML: at 22:09

## 2017-09-02 RX ADMIN — FAMOTIDINE 20 MG: 10 INJECTION INTRAVENOUS at 09:11

## 2017-09-02 RX ADMIN — DIPHENHYDRAMINE HYDROCHLORIDE 25 MG: 25 CAPSULE ORAL at 22:05

## 2017-09-02 RX ADMIN — Medication 10 ML: at 04:53

## 2017-09-02 RX ADMIN — ONDANSETRON 4 MG: 2 INJECTION INTRAMUSCULAR; INTRAVENOUS at 10:06

## 2017-09-02 RX ADMIN — ERYTHROMYCIN: 5 OINTMENT OPHTHALMIC at 22:09

## 2017-09-02 RX ADMIN — MECLIZINE HCL 25 MG: 12.5 TABLET ORAL at 17:25

## 2017-09-02 RX ADMIN — ERYTHROMYCIN: 5 OINTMENT OPHTHALMIC at 09:15

## 2017-09-02 RX ADMIN — METOCLOPRAMIDE 5 MG: 5 INJECTION, SOLUTION INTRAMUSCULAR; INTRAVENOUS at 22:06

## 2017-09-02 RX ADMIN — SODIUM CHLORIDE 125 ML/HR: 900 INJECTION, SOLUTION INTRAVENOUS at 04:53

## 2017-09-02 RX ADMIN — ONDANSETRON 4 MG: 2 INJECTION INTRAMUSCULAR; INTRAVENOUS at 13:48

## 2017-09-02 RX ADMIN — METOCLOPRAMIDE 5 MG: 5 INJECTION, SOLUTION INTRAMUSCULAR; INTRAVENOUS at 16:42

## 2017-09-02 RX ADMIN — ONDANSETRON 4 MG: 2 INJECTION INTRAMUSCULAR; INTRAVENOUS at 19:45

## 2017-09-02 RX ADMIN — SODIUM CHLORIDE 125 ML/HR: 900 INJECTION, SOLUTION INTRAVENOUS at 22:09

## 2017-09-02 RX ADMIN — ENOXAPARIN SODIUM 40 MG: 40 INJECTION SUBCUTANEOUS at 22:05

## 2017-09-02 RX ADMIN — FAMOTIDINE 20 MG: 10 INJECTION INTRAVENOUS at 22:05

## 2017-09-02 NOTE — PROGRESS NOTES
Akash Carlson [facility] 79  [address] [facility], [place], [address] [place] [address]  [phone]      Medical Progress Note      NAME: Royal Rebolledo   :  1965  MRM:  [id number]    Date/Time: 2017  9:57 AM       Assessment and Plan:     Intractable nausea and vomiting (2017): Unclear etiology. Symptoms ongoing for 3 weeks. calcification of GB wall of unclear significance. Appreciate surgery consult, agree no acute surgical intervention. Abd US unrevealing. MRI brain without central cause. Improving with hydration and anti-emetics. Pending GI evaluation (?MRCP, ?HIDA)    Leukocytosis / Polycythemia:  d/t IVVD and nausea and vomiting. Resolving with hydration     Hyperbilirubinemia (2017): UNconjugated. Hemolyzed sample however. CT w/o biliary dilatation or masses. LDH/Hapto not c/w hemolysis. Check TSH, peripheral smear. No hx to support congential abnormality. No new meds.     Hyponatremia (2017):  D/t vomiting. Improving with hydration. Monitor     Acute renal insufficiency (2017):  Likely from n/v and intravascular volume depletion. Continue to hydrate. Monitor     Herpes zoster virus infection of face and ear nerves ():  Was followed and monitored by ENT. Was associated with right facial weakness due to facial nerve involvement. Much improved now from initial per patient and father. CT head and MRI only with right mastoid air cells opacified but no physical exam findings. Subjective:     Chief Complaint:  Patient seen and examined. Chart reviewed. Patient reports improvement in nausea and vomiting and wants to try advancing diet. ROS:  (bold if positive, if negative)      Tolerating PT  Tolerating Diet        Objective:     Last 24hrs VS reviewed since prior progress note.  Most recent are:    Visit Vitals    /71 (BP 1 Location: Right arm, BP Patient Position: At rest)    Pulse 72    Temp 97.4 °F (36.3 °C)    Resp 18    Ht 5' 9\" (1.753 m)    Wt 64.4 kg (142 lb)    SpO2 98%    BMI 20.97 kg/m2     SpO2 Readings from Last 6 Encounters:   09/02/17 98%   08/13/17 99%   08/12/17 98%   01/14/16 99%          Intake/Output Summary (Last 24 hours) at 09/02/17 0957  Last data filed at 09/01/17 1600   Gross per 24 hour   Intake                0 ml   Output              250 ml   Net             -250 ml        Physical Exam:    Gen:  Well-developed, well-nourished, in no acute distress  HEENT:  Pink conjunctivae, scleral icterus, PERRL, hearing intact to voice, moist mucous membranes  Neck:  Supple, without masses, thyroid non-tender  Resp:  No accessory muscle use, clear breath sounds without wheezes rales or rhonchi  Card:  No murmurs, normal S1, S2 without thrills, bruits or peripheral edema  Abd:  Soft, non-tender, non-distended, normoactive bowel sounds are present, no palpable organomegaly and no detectable hernias  Lymph:  No cervical or inguinal adenopathy  Musc:  No cyanosis or clubbing  Skin:  No rashes or ulcers, skin turgor is good  Neuro:  Cranial nerves are grossly intact, no focal motor weakness, follows commands appropriately  Psych:  Good insight, oriented to person, place and time, alert    __________________________________________________________________  Medications Reviewed: (see below)  Medications:     Current Facility-Administered Medications   Medication Dose Route Frequency    sodium chloride (NS) flush 5-10 mL  5-10 mL IntraVENous Q8H    sodium chloride (NS) flush 5-10 mL  5-10 mL IntraVENous PRN    0.9% sodium chloride infusion  125 mL/hr IntraVENous CONTINUOUS    ondansetron (ZOFRAN) injection 4 mg  4 mg IntraVENous Q4H PRN    enoxaparin (LOVENOX) injection 40 mg  40 mg SubCUTAneous Q24H    erythromycin (ILOTYCIN) 5 mg/gram (0.5 %) ophthalmic ointment   Right Eye BID    famotidine (PF) (PEPCID) 20 mg in sodium chloride 0.9 % 10 mL injection  20 mg IntraVENous Q12H    diphenhydrAMINE (BENADRYL) capsule 25 mg  25 mg Oral QHS PRN        Lab Data Reviewed: (see below)  Lab Review:     Recent Labs      09/02/17   0814  09/01/17   1445   WBC  8.4  13.5*   HGB  13.6  18.0*   HCT  38.5  50.0   PLT  208  269     Recent Labs      09/02/17   0814  09/01/17   1853  09/01/17   1445   NA   --   138  132*   K   --   4.1  4.8   CL   --   106  100   CO2   --   22  21   GLU   --   105*  114*   BUN   --   22*  23*   CREA   --   1.05  1.08   CA   --   8.0*  9.4   MG   --    --   2.3   ALB  2.7*  2.9*  3.7   TBILI  4.8*  4.8*  5.5*  5.5*  7.2*   SGOT  17  21  36   ALT  41  41  57   INR   --    --   1.1     Lab Results   Component Value Date/Time    Glucose (POC) 119 08/13/2017 09:48 AM     No results for input(s): PH, PCO2, PO2, HCO3, FIO2 in the last 72 hours. Recent Labs      09/01/17   1445   INR  1.1     All Micro Results     None          I have reviewed notes of prior 24hr.     Other pertinent lab: None    Total time spent with patient: [address] discussed with: Patient and Nursing Staff    Discussed:  Care Plan    Prophylaxis:  Lovenox    Disposition:  Home w/Family           ___________________________________________________    Attending Physician: Stormy Rivera DO

## 2017-09-02 NOTE — PROGRESS NOTES
Pt denies abd pain  Still with nausea  No diarrhea or bm  AVSS  Alert  Breathing unlabored  abd soft nondistended nontender    Plasma Updated: 09/02/17 0857         Protein, total 5.3 (L) g/dL       Albumin 2.7 (L) g/dL       Globulin 2.6 g/dL       A-G Ratio 1.0 (L)         Bilirubin, total 4.8 (H) MG/DL       Bilirubin, direct 0.2 MG/DL       Alk. phosphatase 62 U/L       AST (SGOT) 17 U/L       ALT (SGPT) 41 U/L      BILIRUBIN, FRACTIONATED [[id number]] (Abnormal) Collected: 09/02/17 0814     Order Status: Completed Specimen: Serum or Plasma Updated: 09/02/17 0855      Bilirubin, total 4.8 (H) MG/DL       Bilirubin, direct 0.3 (H) MG/DL       Bilirubin, indirect 4.5 (H) MG/DL      CBC WITH AUTOMATED DIFF [[id number]] Collected: 09/02/17 0814     Order Status: Completed Specimen: Whole Blood from Whole Blood Updated: 09/02/17 0831      WBC 8.4 K/uL       RBC 4.54 M/uL       HGB 13.6 g/dL       HCT 38.5 %       MCV 84.8 FL       MCH 30.0 PG       MCHC 35.3 g/dL       RDW 13.8 %       PLATELET 826 K/uL       NEUTROPHILS 58 %       LYMPHOCYTES 28 %       MONOCYTES 12 %       EOSINOPHILS 2 %       BASOPHILS 0 %       ABS. NEUTROPHILS 5.0 K/UL       ABS. LYMPHOCYTES 2.3 K/UL       ABS. MONOCYTES 1.0 K/UL       ABS. EOSINOPHILS 0.2 K      A/P  Jaundice, nausea  Possible calcification of GB on CT  No biliary dilatation  No abd pain or tenderness  ?  Medical cause for acute illness  Does not appear to have acute surgical problem but will follow

## 2017-09-02 NOTE — ROUTINE PROCESS
Bedside and Verbal shift change report given to Main Moran RN (oncoming nurse) by Harsh Moody (offgoing nurse). Report included the following information SBAR, Kardex, Intake/Output, MAR, Accordion and Recent Results.

## 2017-09-02 NOTE — CONSULTS
Akash Carlson [address]   [address] [facility], [facility]   [address] [facility]       Name:  Anthony Salmon   MR#:  [id number]   :  1965   Account #:  [de-identified]    Date of Consultation:  2017   Date of Adm:  2017       REQUESTING PHYSICIAN: Jewel Ko MD, in the emergency   room, regarding nausea, vomiting and jaundice. HISTORY OF PRESENT ILLNESS: A 56-year-old male who states he   is in general good health. He says he was recently treated for an ear   infection, did take antibiotics for that. Over the past few weeks he has   noted nausea and vomiting which was worse over this past week. He   has noted increased weakness as well as weight loss with 20 pound   weight loss over the last 3 weeks. According to the ER notes, the   patient actually had a right ear shingles infection and had developed   right facial nerve palsy and had initially not been eating well because of   the facial pain. The patient denies fevers, chills, or sweats. He has   been urinating less frequently, but otherwise has no urinary symptoms   such as dysuria. He has had some constipation. No diarrhea. He   denies any abdominal or back pain, although the ER states he has   been having some left flank pain for over a year. Prior to his ear   infection, he denies taking any medications or having any other   significant medical history. PAST SURGICAL HISTORY: Neck surgery. MEDICATIONS: He says he did take antibiotics for the ear infection   but otherwise does not normally take any medications. ALLERGIES: HE DENIES ANY ALLERGIES. SOCIAL HISTORY: Does not smoke or drink alcohol. He works as an   , has not been able to work over several weeks because of   this current illness. FAMILY HISTORY: Denies any significant GI history in his family. REVIEW OF SYSTEMS   GENERAL: The past few weeks he has been feeling increasingly   weak, no fevers or chills.    ENT: See above regarding ear infection, no hoarseness or sore throat. OPHTHALMOLOGIC: No photophobia or blurred vision. CARDIAC: No chest pain, palpitations. PULMONARY: No cough, shortness of breath. GASTROINTESTINAL: He has had nausea and vomiting. No   hematemesis, no diarrhea, no blood per rectum. GENITOURINARY: No dysuria, hematuria. Decreased frequency in   amounts of urine. MUSCULOSKELETAL: No arthralgias, myalgias. NEUROLOGIC: No syncope. He has felt lightheaded at times. He has   some generalized weakness, but no focal weakness. HEMATOLOGIC: No unusual bruising or bleeding. PHYSICAL EXAMINATION   GENERAL: He is a well-developed male who appears to be in no   acute distress. VITAL SIGNS: He is afebrile. Vital signs are stable. HEENT: Normocephalic, atraumatic. NECK: Shows no adenopathy. LUNGS: Clear. CARDIAC: Regular rate and rhythm. ABDOMEN: Soft, nondistended. Normal bowel sounds. Nontender. No   masses palpated. EXTREMITIES: No edema. NEUROLOGIC: Alert and oriented. Speech is fluent. SKIN: Without rashes on exposed skin. LABORATORY STUDIES: His electrolytes are normal. BUN 22,   creatinine 1, bilirubin total 5.5. ALT, AST normal, alkaline phosphatase   normal. Albumin 2.9. Indirect bilirubin is 5.3. The urinalysis is   essentially unremarkable. Magnesium was normal. Lipase 395, lactic   acid 1.9. INR 1.1. White count 13.5, hematocrit 50, platelet count 994. CT scan abdomen and pelvis was obtained. I have reviewed the   imaging and report. There is possible mild calcification of gallbladder   wall. No gallstones are seen. No bile duct dilatation and no mass is   noted. The bowel appears unremarkable. There is no ascites or   pneumoperitoneum. No inflammatory changes are noted. There are   nonobstructing renal calculi noted. IMPRESSION: This is a 71-year-old male with nausea, vomiting and   jaundice, possible calcification of the gallbladder, but no gallstones   seen.  No bile duct dilatation. He has no abdominal pain or tenderness,   does not have evidence of any acute surgical issues. Dr. Kumar Miranda has seen   the patient and is evaluating him from a medical standpoint. The patient does appear to have some dehydration. He is receiving IV   fluids. I discussed the patient with Dr. Kumar Miranda. He is going to obtain an   MRI to check for central cause of nausea and vomiting. At this point, no acute surgical issues. We will followup tomorrow. Thank you for asking me to the patient.         MD ANDRES Manuel / Lisette Chaudhari   D:  09/01/2017   20:22   T:  09/01/2017   20:44   Job #:  [id number]

## 2017-09-02 NOTE — CONSULTS
Rod Blinks. Lilly Peguero MD  [phone] office  [phone] voicemail   Gastroenterology Consultation Note      Admit Date: 9/1/2017  Consult Date: 9/2/2017   I greatly appreciate your asking me to see Adela Costa, thank you very much for the opportunity to participate in his care. Narrative Assessment and Plan   · Vomiting - I think this is secondary to   [place] Hunt syndrome - he has the triad: ipsilateral facial paralysis, ear pain, and vesicles in the auditory canal and auricle and I think his vomiting is vestibular not gastroenterologic (as supported by no luminal abnormalities on CT). His hyperbilirubinemia suggests to me preexisting Gilbert's syndrome exacerbated by his current illness. Dr. Benedicto Robles has already considered alternate hypotheses and they are excluded by his current dataset     Neuro consult? ID consult?  - in regards to his ongoing symptoms related to cranial nerve shingles. .. Is there a role for antiviral therapy? Is there a role for anti-inflammatory therapy - my brief review of the literature suggests no role for steroids, and although antiviral therapy is frequently used it is not seen as effective. Symptomatic relief with meclizine? I've added that  Symptomatic relief with reglan? I've added that    I will follow with you. Subjective:     Chief Complaint: Vomiting    History of Present Illness: Recently diagnosed with 'shingles of the ear'. Now has vertigo then vomiting  Recurrent  3 weeks  Also appears to have facial droop on right side  Right eye seems to be lacrimating      PCP:  Eyal Mazariegos MD    Past Medical History:   Diagnosis Date    H/O scarlet fever     Herpes zoster virus infection of face and ear nerves     8/2017: With facial weakness on right.     Ill-defined condition     spinal contusion    Ill-defined condition     broken bone in left knee        Past Surgical History:   Procedure Laterality Date    HX OTHER SURGICAL      surgery on neck    HX UROLOGICAL Right     surgery/biopsy on right kidney       Social History   Substance Use Topics    Smoking status: Never Smoker    Smokeless tobacco: Never Used    Alcohol use No        Family History   Problem Relation Age of Onset    Hypertension Father         No Known Allergies         Home Medications:  Prior to Admission Medications   Prescriptions Last Dose Informant Patient Reported? Taking? Omeprazole delayed release (PRILOSEC D/R) 20 mg tablet   Yes Yes   Sig: Take 20 mg by mouth daily. erythromycin (ILOTYCIN) ophthalmic ointment 9/1/2017 at AM  Yes Yes   Sig: Administer  to right eye two (2) times a day. meclizine (ANTIVERT) 25 mg tablet   Yes Yes   Sig: Take 25 mg by mouth three (3) times daily as needed. ondansetron (ZOFRAN ODT) 4 mg disintegrating tablet   Yes Yes   Sig: Take 4 mg by mouth every eight (8) hours as needed for Nausea.       Facility-Administered Medications: None       Hospital Medications:  Current Facility-Administered Medications   Medication Dose Route Frequency    sodium chloride (NS) flush 5-10 mL  5-10 mL IntraVENous Q8H    sodium chloride (NS) flush 5-10 mL  5-10 mL IntraVENous PRN    0.9% sodium chloride infusion  125 mL/hr IntraVENous CONTINUOUS    ondansetron (ZOFRAN) injection 4 mg  4 mg IntraVENous Q4H PRN    enoxaparin (LOVENOX) injection 40 mg  40 mg SubCUTAneous Q24H    erythromycin (ILOTYCIN) 5 mg/gram (0.5 %) ophthalmic ointment   Right Eye BID    famotidine (PF) (PEPCID) 20 mg in sodium chloride 0.9 % 10 mL injection  20 mg IntraVENous Q12H    diphenhydrAMINE (BENADRYL) capsule 25 mg  25 mg Oral QHS PRN       Review of Systems: Admission ROS by Teo Antonio MD from 9/1/2017 were reviewed with the patient and changes (other than per HPI) include: none      Objective:     Physical Exam:  Visit Vitals    /67 (BP 1 Location: Right arm, BP Patient Position: At rest)    Pulse 79    Temp 98.1 °F (36.7 °C)    Resp 16    Ht 5' 9\" (1.753 m)    Wt 64.4 kg (142 lb)    SpO2 97%    BMI 20.97 kg/m2     SpO2 Readings from Last 6 Encounters:   09/02/17 97%   08/13/17 99%   08/12/17 98%   01/14/16 99%          Intake/Output Summary (Last 24 hours) at 09/02/17 1517  Last data filed at 09/01/17 1600   Gross per 24 hour   Intake                0 ml   Output              250 ml   Net             -250 ml      General: no distress, comfortable  HEENT: Pupils equal, sclera anicteric, oropharynx with no gross lesions but right face is not symmetric to left  Cardiovascular: No abnormal audible heart sounds, well perfused, no edema  Respiratory:  No abnormal audible breath sounds, normal respiratory effort, no throacic deformity  GI:   Abdomen nondistended, nontender, no mass, no free fluid, no rebound or guarding. Musculoskeletal:  No skeletal deformity nor acute arthritis noted.   Psychiatric:  Normal affect, memory intact, appears to have insight into current illness  Lymphatic:  No cervical, supraclavicular, or periumbilic lymphadenopathy    Laboratory:    Recent Results (from the past 24 hour(s))   URINALYSIS W/ RFLX MICROSCOPIC    Collection Time: 09/01/17  4:16 PM   Result Value Ref Range    Color DARK YELLOW      Appearance CLOUDY (A) CLEAR      Specific gravity 1.025 1.003 - 1.030      pH (UA) 7.0 5.0 - 8.0      Protein TRACE (A) NEG mg/dL    Glucose NEGATIVE  NEG mg/dL    Ketone 40 (A) NEG mg/dL    Bilirubin NEGATIVE  NEG      Blood NEGATIVE  NEG      Urobilinogen 1.0 0.2 - 1.0 EU/dL    Nitrites NEGATIVE  NEG      Leukocyte Esterase NEGATIVE  NEG      WBC 0-4 0 - 4 /hpf    RBC 0-5 0 - 5 /hpf    Epithelial cells FEW FEW /lpf    Bacteria NEGATIVE  NEG /hpf    Amorphous Crystals FEW (A) NEG      Granular cast 2-5 (A) NEG /lpf   METABOLIC PANEL, COMPREHENSIVE    Collection Time: 09/01/17  6:53 PM   Result Value Ref Range    Sodium 138 136 - 145 mmol/L    Potassium 4.1 3.5 - 5.1 mmol/L    Chloride 106 97 - 108 mmol/L    CO2 22 21 - 32 mmol/L    Anion gap 10 5 - 15 mmol/L    Glucose 105 (H) 65 - 100 mg/dL    BUN 22 (H) 6 - 20 MG/DL    Creatinine 1.05 0.70 - 1.30 MG/DL    BUN/Creatinine ratio 21 (H) 12 - 20      GFR est AA >60 >60 ml/min/1.73m2    GFR est non-AA >60 >60 ml/min/1.73m2    Calcium 8.0 (L) 8.5 - 10.1 MG/DL    Bilirubin, total 5.5 (H) 0.2 - 1.0 MG/DL    ALT (SGPT) 41 12 - 78 U/L    AST (SGOT) 21 15 - 37 U/L    Alk. phosphatase 72 45 - 117 U/L    Protein, total 5.6 (L) 6.4 - 8.2 g/dL    Albumin 2.9 (L) 3.5 - 5.0 g/dL    Globulin 2.7 2.0 - 4.0 g/dL    A-G Ratio 1.1 1.1 - 2.2     BILIRUBIN, FRACTIONATED    Collection Time: 09/01/17  6:53 PM   Result Value Ref Range    Bilirubin, total 5.5 (H) 0.2 - 1.0 MG/DL    Bilirubin, direct 0.2 0.0 - 0.2 MG/DL    Bilirubin, indirect 5.3 (H) 0 - 1.1 MG/DL   HAPTOGLOBIN    Collection Time: 09/01/17  6:53 PM   Result Value Ref Range    Haptoglobin 177 30 - 200 mg/dL   LD    Collection Time: 09/01/17  6:53 PM   Result Value Ref Range     85 - 241 U/L   HEPATIC FUNCTION PANEL    Collection Time: 09/02/17  8:14 AM   Result Value Ref Range    Protein, total 5.3 (L) 6.4 - 8.2 g/dL    Albumin 2.7 (L) 3.5 - 5.0 g/dL    Globulin 2.6 2.0 - 4.0 g/dL    A-G Ratio 1.0 (L) 1.1 - 2.2      Bilirubin, total 4.8 (H) 0.2 - 1.0 MG/DL    Bilirubin, direct 0.2 0.0 - 0.2 MG/DL    Alk.  phosphatase 62 45 - 117 U/L    AST (SGOT) 17 15 - 37 U/L    ALT (SGPT) 41 12 - 78 U/L   BILIRUBIN, FRACTIONATED    Collection Time: 09/02/17  8:14 AM   Result Value Ref Range    Bilirubin, total 4.8 (H) 0.2 - 1.0 MG/DL    Bilirubin, direct 0.3 (H) 0.0 - 0.2 MG/DL    Bilirubin, indirect 4.5 (H) 0 - 1.1 MG/DL   CBC WITH AUTOMATED DIFF    Collection Time: 09/02/17  8:14 AM   Result Value Ref Range    WBC 8.4 4.1 - 11.1 K/uL    RBC 4.54 4.10 - 5.70 M/uL    HGB 13.6 12.1 - 17.0 g/dL    HCT 38.5 36.6 - 50.3 %    MCV 84.8 80.0 - 99.0 FL    MCH 30.0 26.0 - 34.0 PG    MCHC 35.3 30.0 - 36.5 g/dL    RDW 13.8 11.5 - 14.5 %    PLATELET 207 713 - 608 K/uL    NEUTROPHILS 58 32 - 75 %    LYMPHOCYTES 28 12 - 49 %    MONOCYTES 12 5 - 13 %    EOSINOPHILS 2 0 - 7 %    BASOPHILS 0 0 - 1 %    ABS. NEUTROPHILS 5.0 1.8 - 8.0 K/UL    ABS. LYMPHOCYTES 2.3 0.8 - 3.5 K/UL    ABS. MONOCYTES 1.0 0.0 - 1.0 K/UL    ABS. EOSINOPHILS 0.2 0.0 - 0.4 K/UL    ABS. BASOPHILS 0.0 0.0 - 0.1 K/UL   TSH 3RD GENERATION    Collection Time: 09/02/17  8:14 AM   Result Value Ref Range    TSH 0.51 0.36 - 3.74 uIU/mL         Assessment/Plan:     Principal Problem:    Intractable nausea and vomiting (9/1/2017)    Active Problems:    Leukocytosis (9/1/2017)      Hyperbilirubinemia (9/1/2017)      Hyponatremia (9/1/2017)      Acute renal insufficiency (9/1/2017)      Herpes zoster virus infection of face and ear nerves ()         See above narrative for full detail.

## 2017-09-02 NOTE — ROUTINE PROCESS
Primary Nurse Trisha Quiles RN and Sharri Pandya RN performed a dual skin assessment on this patient No impairment noted  Dm score is 21.

## 2017-09-02 NOTE — ROUTINE PROCESS
Bedside and Verbal shift change report given to Gurjit Gaytan RN (oncoming nurse) by Main Moran RN (offgoing nurse). Report included the following information SBAR, Kardex, ED Summary, Intake/Output, MAR and Recent Results.

## 2017-09-03 LAB
ALBUMIN SERPL-MCNC: 2.8 G/DL (ref 3.5–5)
ALBUMIN/GLOB SERPL: 1.1 {RATIO} (ref 1.1–2.2)
ALP SERPL-CCNC: 64 U/L (ref 45–117)
ALT SERPL-CCNC: 40 U/L (ref 12–78)
ANION GAP SERPL CALC-SCNC: 12 MMOL/L (ref 5–15)
AST SERPL-CCNC: 19 U/L (ref 15–37)
BASOPHILS # BLD: 0 K/UL (ref 0–0.1)
BASOPHILS NFR BLD: 0 % (ref 0–1)
BILIRUB DIRECT SERPL-MCNC: 0.3 MG/DL (ref 0–0.2)
BILIRUB SERPL-MCNC: 6.1 MG/DL (ref 0.2–1)
BUN SERPL-MCNC: 10 MG/DL (ref 6–20)
BUN/CREAT SERPL: 12 (ref 12–20)
CALCIUM SERPL-MCNC: 8.2 MG/DL (ref 8.5–10.1)
CHLORIDE SERPL-SCNC: 108 MMOL/L (ref 97–108)
CO2 SERPL-SCNC: 18 MMOL/L (ref 21–32)
CREAT SERPL-MCNC: 0.83 MG/DL (ref 0.7–1.3)
EOSINOPHIL # BLD: 0.2 K/UL (ref 0–0.4)
EOSINOPHIL NFR BLD: 2 % (ref 0–7)
ERYTHROCYTE [DISTWIDTH] IN BLOOD BY AUTOMATED COUNT: 13.5 % (ref 11.5–14.5)
GLOBULIN SER CALC-MCNC: 2.6 G/DL (ref 2–4)
GLUCOSE SERPL-MCNC: 80 MG/DL (ref 65–100)
HCT VFR BLD AUTO: 39.1 % (ref 36.6–50.3)
HGB BLD-MCNC: 13.6 G/DL (ref 12.1–17)
LYMPHOCYTES # BLD: 3.1 K/UL (ref 0.8–3.5)
LYMPHOCYTES NFR BLD: 33 % (ref 12–49)
MAGNESIUM SERPL-MCNC: 1.9 MG/DL (ref 1.6–2.4)
MCH RBC QN AUTO: 29.6 PG (ref 26–34)
MCHC RBC AUTO-ENTMCNC: 34.8 G/DL (ref 30–36.5)
MCV RBC AUTO: 85 FL (ref 80–99)
MONOCYTES # BLD: 0.9 K/UL (ref 0–1)
MONOCYTES NFR BLD: 10 % (ref 5–13)
NEUTS SEG # BLD: 5.2 K/UL (ref 1.8–8)
NEUTS SEG NFR BLD: 55 % (ref 32–75)
PERIPHERAL SMEAR,PSM: NORMAL
PHOSPHATE SERPL-MCNC: 2.8 MG/DL (ref 2.6–4.7)
PLATELET # BLD AUTO: 193 K/UL (ref 150–400)
POTASSIUM SERPL-SCNC: 3.5 MMOL/L (ref 3.5–5.1)
PROT SERPL-MCNC: 5.4 G/DL (ref 6.4–8.2)
RBC # BLD AUTO: 4.6 M/UL (ref 4.1–5.7)
SODIUM SERPL-SCNC: 138 MMOL/L (ref 136–145)
WBC # BLD AUTO: 9.4 K/UL (ref 4.1–11.1)

## 2017-09-03 PROCEDURE — 84100 ASSAY OF PHOSPHORUS: CPT | Performed by: INTERNAL MEDICINE

## 2017-09-03 PROCEDURE — 74011250637 HC RX REV CODE- 250/637: Performed by: INTERNAL MEDICINE

## 2017-09-03 PROCEDURE — 74011000250 HC RX REV CODE- 250: Performed by: INTERNAL MEDICINE

## 2017-09-03 PROCEDURE — 74011636637 HC RX REV CODE- 636/637: Performed by: PSYCHIATRY & NEUROLOGY

## 2017-09-03 PROCEDURE — 85025 COMPLETE CBC W/AUTO DIFF WBC: CPT | Performed by: INTERNAL MEDICINE

## 2017-09-03 PROCEDURE — 74011250636 HC RX REV CODE- 250/636: Performed by: SPECIALIST

## 2017-09-03 PROCEDURE — 74011250636 HC RX REV CODE- 250/636: Performed by: INTERNAL MEDICINE

## 2017-09-03 PROCEDURE — 83735 ASSAY OF MAGNESIUM: CPT | Performed by: INTERNAL MEDICINE

## 2017-09-03 PROCEDURE — 80048 BASIC METABOLIC PNL TOTAL CA: CPT | Performed by: INTERNAL MEDICINE

## 2017-09-03 PROCEDURE — 80076 HEPATIC FUNCTION PANEL: CPT | Performed by: INTERNAL MEDICINE

## 2017-09-03 PROCEDURE — 36415 COLL VENOUS BLD VENIPUNCTURE: CPT | Performed by: INTERNAL MEDICINE

## 2017-09-03 PROCEDURE — 65270000029 HC RM PRIVATE

## 2017-09-03 RX ORDER — PREDNISONE 20 MG/1
20 TABLET ORAL 2 TIMES DAILY WITH MEALS
Status: DISCONTINUED | OUTPATIENT
Start: 2017-09-03 | End: 2017-09-04 | Stop reason: HOSPADM

## 2017-09-03 RX ADMIN — Medication 10 ML: at 21:02

## 2017-09-03 RX ADMIN — SODIUM CHLORIDE 125 ML/HR: 900 INJECTION, SOLUTION INTRAVENOUS at 15:33

## 2017-09-03 RX ADMIN — ERYTHROMYCIN: 5 OINTMENT OPHTHALMIC at 09:34

## 2017-09-03 RX ADMIN — ENOXAPARIN SODIUM 40 MG: 40 INJECTION SUBCUTANEOUS at 21:02

## 2017-09-03 RX ADMIN — ONDANSETRON 4 MG: 2 INJECTION INTRAMUSCULAR; INTRAVENOUS at 12:44

## 2017-09-03 RX ADMIN — PREDNISONE 20 MG: 20 TABLET ORAL at 18:24

## 2017-09-03 RX ADMIN — METOCLOPRAMIDE 5 MG: 5 INJECTION, SOLUTION INTRAMUSCULAR; INTRAVENOUS at 21:03

## 2017-09-03 RX ADMIN — Medication 10 ML: at 06:00

## 2017-09-03 RX ADMIN — ONDANSETRON 4 MG: 2 INJECTION INTRAMUSCULAR; INTRAVENOUS at 06:00

## 2017-09-03 RX ADMIN — SODIUM CHLORIDE 100 ML/HR: 900 INJECTION, SOLUTION INTRAVENOUS at 22:13

## 2017-09-03 RX ADMIN — SODIUM CHLORIDE 125 ML/HR: 900 INJECTION, SOLUTION INTRAVENOUS at 06:00

## 2017-09-03 RX ADMIN — FAMOTIDINE 20 MG: 10 INJECTION INTRAVENOUS at 21:03

## 2017-09-03 RX ADMIN — METOCLOPRAMIDE 5 MG: 5 INJECTION, SOLUTION INTRAMUSCULAR; INTRAVENOUS at 15:32

## 2017-09-03 RX ADMIN — METOCLOPRAMIDE 5 MG: 5 INJECTION, SOLUTION INTRAMUSCULAR; INTRAVENOUS at 09:34

## 2017-09-03 RX ADMIN — MECLIZINE HCL 25 MG: 12.5 TABLET ORAL at 09:33

## 2017-09-03 RX ADMIN — ONDANSETRON 4 MG: 2 INJECTION INTRAMUSCULAR; INTRAVENOUS at 17:30

## 2017-09-03 RX ADMIN — FAMOTIDINE 20 MG: 10 INJECTION INTRAVENOUS at 09:33

## 2017-09-03 RX ADMIN — ONDANSETRON 4 MG: 2 INJECTION INTRAMUSCULAR; INTRAVENOUS at 21:13

## 2017-09-03 RX ADMIN — MECLIZINE HCL 25 MG: 12.5 TABLET ORAL at 17:27

## 2017-09-03 RX ADMIN — DIPHENHYDRAMINE HYDROCHLORIDE 25 MG: 25 CAPSULE ORAL at 21:02

## 2017-09-03 RX ADMIN — ERYTHROMYCIN: 5 OINTMENT OPHTHALMIC at 21:13

## 2017-09-03 NOTE — CONSULTS
[facility] [address]   [place] [address]   [facility]   [phone] [facility]   [phone] Fax         Dictated, thanks  Describes dx RHS  Increased n/v and imbalance after prednisone discontinued without taper  Debate as to whether helpful or not  Antivirals not considered helpful and he's 3 weeks out--bell's actually improving and 2 weeks out from [place]  MRI with FIESTA seq without CN abnormalities  Exam with CN 7 peripheral right otw nml except dried ear vesicles right and jaundiced sclerae    will taper prednisone to see if this helps sxs otherwise treat symptomatically and give time  RHS certainly does not explain jaundice  Team to follow up in am  D/w pt, brother and father    Christine Zimmerman MD

## 2017-09-03 NOTE — PROGRESS NOTES
Akash Silvestreelsen [facility]  [address] [facility], [address] [place] [address]  [phone]      Medical Progress Note      NAME: Dilip Harris   :  1965  MRM:  [id number]    Date/Time: 9/3/2017  9:57 AM       Assessment and Plan:     Intractable nausea and vomiting (2017): Unclear etiology. Appreciate surgery consult, agree no acute surgical intervention, may require elective choley for calcified GB. Abd US unrevealing. MRI brain without central cause. Discussed with Dr. Kobe Patel, he pointed out possibility of Philippe-Hunt syndrome and this fits quite well. Will ask neuro opinion on steroids. Continue to ADAT and IVF hydration. If stable sx, may be appropriate for transition to outpatient. Leukocytosis / Polycythemia:  d/t IVVD and nausea and vomiting. Resolving with hydration     Hyperbilirubinemia (2017): UNconjugated. Hemolyzed sample however. CT w/o biliary dilatation or masses. LDH/Hapto not c/w hemolysis. TSH nml. Peripheral smear with reactive process. No hx to support congential abnormality. No new meds. Likely Gilbert's syndrome.     Hyponatremia (2017):  D/t vomiting. Improving with hydration. Monitor     Acute renal insufficiency (2017):  Likely from n/v and intravascular volume depletion. Continue to hydrate. Monitor     Herpes zoster virus infection of face and ear nerves ():  Was followed and monitored by ENT. Was associated with right facial weakness due to facial nerve involvement. Much improved now from initial per patient and father. CT head and MRI only with right mastoid air cells opacified but no physical exam findings. Possible Philippe-Sweeney. Neuro consult         Subjective:     Chief Complaint:  Patient seen and examined. Chart reviewed. Patient reports improvement in nausea and vomiting but had a good deal of nausea and vomiting yesterday with advanced diet.     ROS:  (bold if positive, if negative)      Tolerating PT  Tolerating Diet        Objective:     Last 24hrs VS reviewed since prior progress note.  Most recent are:    Visit Vitals    /70 (BP 1 Location: Left arm, BP Patient Position: At rest)    Pulse 76    Temp 98.4 °F (36.9 °C)    Resp 16    Ht 5' 9\" (1.753 m)    Wt 64.4 kg (142 lb)    SpO2 97%    BMI 20.97 kg/m2     SpO2 Readings from Last 6 Encounters:   09/03/17 97%   08/13/17 99%   08/12/17 98%   01/14/16 99%        No intake or output data in the 24 hours ending 09/03/17 1141     Physical Exam:    Gen:  Well-developed, well-nourished, in no acute distress  HEENT:  Pink conjunctivae, scleral icterus, PERRL, hearing intact to voice, moist mucous membranes  Neck:  Supple, without masses, thyroid non-tender  Resp:  No accessory muscle use, clear breath sounds without wheezes rales or rhonchi  Card:  No murmurs, normal S1, S2 without thrills, bruits or peripheral edema  Abd:  Soft, non-tender, non-distended, normoactive bowel sounds are present, no palpable organomegaly and no detectable hernias  Lymph:  No cervical or inguinal adenopathy  Musc:  No cyanosis or clubbing  Skin:  No rashes or ulcers, skin turgor is good  Neuro:  Cranial nerves are grossly intact, no focal motor weakness, follows commands appropriately  Psych:  Good insight, oriented to person, place and time, alert    __________________________________________________________________  Medications Reviewed: (see below)  Medications:     Current Facility-Administered Medications   Medication Dose Route Frequency    meclizine (ANTIVERT) tablet 25 mg  25 mg Oral BID    metoclopramide HCl (REGLAN) injection 5 mg  5 mg IntraVENous TID    sodium chloride (NS) flush 5-10 mL  5-10 mL IntraVENous Q8H    sodium chloride (NS) flush 5-10 mL  5-10 mL IntraVENous PRN    0.9% sodium chloride infusion  125 mL/hr IntraVENous CONTINUOUS    ondansetron (ZOFRAN) injection 4 mg  4 mg IntraVENous Q4H PRN    enoxaparin (LOVENOX) injection 40 mg  40 mg SubCUTAneous Q24H    erythromycin (ILOTYCIN) 5 mg/gram (0.5 %) ophthalmic ointment   Right Eye BID    famotidine (PF) (PEPCID) 20 mg in sodium chloride 0.9 % 10 mL injection  20 mg IntraVENous Q12H    diphenhydrAMINE (BENADRYL) capsule 25 mg  25 mg Oral QHS PRN        Lab Data Reviewed: (see below)  Lab Review:     Recent Labs      09/03/17   0441  09/02/17   0814  09/01/17   1445   WBC  9.4  8.4  13.5*   HGB  13.6  13.6  18.0*   HCT  39.1  38.5  50.0   PLT  193  208  269     Recent Labs      09/03/17   0441  09/02/17   0814  09/01/17   1853  09/01/17   1445   NA  138   --   138  132*   K  3.5   --   4.1  4.8   CL  108   --   106  100   CO2  18*   --   22  21   GLU  80   --   105*  114*   BUN  10   --   22*  23*   CREA  0.83   --   1.05  1.08   CA  8.2*   --   8.0*  9.4   MG  1.9   --    --   2.3   PHOS  2.8   --    --    --    ALB  2.8*  2.7*  2.9*  3.7   TBILI  6.1*  4.8*  4.8*  5.5*  5.5*  7.2*   SGOT  19  17  21  36   ALT  40  41  41  57   INR   --    --    --   1.1     Lab Results   Component Value Date/Time    Glucose (POC) 119 08/13/2017 09:48 AM     No results for input(s): PH, PCO2, PO2, HCO3, FIO2 in the last 72 hours. Recent Labs      09/01/17   1445   INR  1.1     All Micro Results     None          I have reviewed notes of prior 24hr.     Other pertinent lab: None    Total time spent with patient: 28 [address] [facility] discussed with: Patient and Nursing Staff    Discussed:  Care Plan    Prophylaxis:  Lovenox    Disposition:  Home w/Family           ___________________________________________________    Attending Physician: Jerelene Denver, DO

## 2017-09-03 NOTE — PROGRESS NOTES
Surgery    Pt feeling better, no vomiting yet today. Denies abdominal pain  AF, VSS  Icteric sclera  Abdomen soft, nttp  Bili still 6, but other LFT's negative  Plan: no indication for surgical intervention at this time.

## 2017-09-03 NOTE — ROUTINE PROCESS
Bedside and Verbal shift change report given to Yarely Decker RN (oncoming nurse) by Suzette Grey (offgoing nurse). Report included the following information SBAR, Kardex, Intake/Output, MAR, Accordion and Recent Results.

## 2017-09-03 NOTE — ROUTINE PROCESS
Bedside and Verbal shift change report given to Wilian Avelar RN (oncoming nurse) by Lexie Chanel RN (offgoing nurse). Report included the following information SBAR, Kardex, ED Summary, Intake/Output, MAR and Recent Results.

## 2017-09-03 NOTE — PROGRESS NOTES
Tom Sousa. Kathleen Nicolas MD  [phone] office  [phone] [facility]     Gastroenterology Progress Note    September 3, 2017  Admit Date: 2017         Narrative Assessment and Plan   · Vomiting - seems to have improved, able to eat breakfast and lunch so far without vomiting today. Not sure if that's the reglan, the meclizine or if meds are unrelated to improvement. 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 The question of his gallbladder, I leave to the surgeon. 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 Admittedly this condition doesn't usually lead to bilirubin values higher than 2 but with fasting or reduced caloric intake bilirubin production skyrockets; and that's my explanation for his current higher than expected unconjugated hyperbilirubinemia. We've excluded hemolysis, and he's too old to consider Crigler-Najjar type I or II - these are  diseases. Subjective:   · I'm feeling a little better    Location:  No vomiting today  Duration: denies abdominal pain  Timing: no fever or chills  Radiation:    Associated Symptoms:   Aggravating/Alleviating factors:     ROS:  The previous review of systems on initial consultation / H&P is noted and reviewed. Specific changes noted above in HPI.     Current Medications:     Current Facility-Administered Medications   Medication Dose Route Frequency    meclizine (ANTIVERT) tablet 25 mg  25 mg Oral BID    metoclopramide HCl (REGLAN) injection 5 mg  5 mg IntraVENous TID    sodium chloride (NS) flush 5-10 mL  5-10 mL IntraVENous Q8H    sodium chloride (NS) flush 5-10 mL  5-10 mL IntraVENous PRN    0.9% sodium chloride infusion  125 mL/hr IntraVENous CONTINUOUS    ondansetron (ZOFRAN) injection 4 mg  4 mg IntraVENous Q4H PRN    enoxaparin (LOVENOX) injection 40 mg  40 mg SubCUTAneous Q24H    erythromycin (ILOTYCIN) 5 mg/gram (0.5 %) ophthalmic ointment   Right Eye BID    famotidine (PF) (PEPCID) 20 mg in sodium chloride 0.9 % 10 mL injection  20 mg IntraVENous Q12H    diphenhydrAMINE (BENADRYL) capsule 25 mg  25 mg Oral QHS PRN       Objective:     VITALS:   Last 24hrs VS reviewed since prior progress note. Most recent are:  Visit Vitals    /78 (BP 1 Location: Left arm, BP Patient Position: At rest)    Pulse 81    Temp 98.2 °F (36.8 °C)    Resp 16    Ht 5' 9\" (1.753 m)    Wt 64.4 kg (142 lb)    SpO2 98%    BMI 20.97 kg/m2     Temp (24hrs), Av.4 °F (36.9 °C), Min:98.2 °F (36.8 °C), Max:98.7 °F (37.1 °C)    No intake or output data in the 24 hours ending 17 1302    EXAM:  General: Comfortable, no distress    HEENT:  Atraumatic skull, pupils equal  Lungs:   No abnormal audible breath sounds. Speaking in complete sentences  Heart:   No abnormal audible heart sounds. Well perfused  Abdomen:   Nondistended, nontender.   No mass, guarding or rebound  Musc:   No skeletal defects or deformities  Neurologic:   Cranial nerves grossly intact, moves all 4 extremities  Psych:   Good insight. Not anxious nor agitated      Lab Data Reviewed:   Recent Labs      17   0441  17   0814  17   1445   WBC  9.4  8.4  13.5*   HGB  13.6  13.6  18.0*   HCT  39.1  38.5  50.0   PLT  193  208  269     Recent Labs      17   0441  17   0814  17   1853  17   1445   NA  138   --   138  132*   K  3.5   --   4.1  4.8   CL  108   --   106  100   CO2  18*   --   22  21   GLU  80   --   105*  114*   BUN  10   --   22*  23*   CREA  0.83   --   1.05  1.08   CA  8.2*   --   8.0*  9.4   MG  1.9   --    --   2.3   PHOS  2.8 --    --    --    ALB  2.8*  2.7*  2.9*  3.7   TBILI  6.1*  4.8*  4.8*  5.5*  5.5*  7.2*   SGOT  19  17  21  36   ALT  40  41  41  57   INR   --    --    --   1.1     Lab Results   Component Value Date/Time    Glucose (POC) 119 08/13/2017 09:48 AM     No results for input(s): PH, PCO2, PO2, HCO3, FIO2 in the last 72 hours.   Recent Labs      09/01/17   1445   INR  1.1           Assessment:   (See above)  Principal Problem:    Intractable nausea and vomiting (9/1/2017)    Active Problems:    Leukocytosis (9/1/2017)      Hyperbilirubinemia (9/1/2017)      Hyponatremia (9/1/2017)      Acute renal insufficiency (9/1/2017)      Herpes zoster virus infection of face and ear nerves ()        Plan:   (See above)      Signed By: Jose Armando Edgar MD     9/3/2017  1:02 PM

## 2017-09-04 VITALS
BODY MASS INDEX: 21.03 KG/M2 | HEART RATE: 70 BPM | RESPIRATION RATE: 16 BRPM | WEIGHT: 142 LBS | SYSTOLIC BLOOD PRESSURE: 133 MMHG | DIASTOLIC BLOOD PRESSURE: 87 MMHG | TEMPERATURE: 98 F | OXYGEN SATURATION: 98 % | HEIGHT: 69 IN

## 2017-09-04 PROBLEM — G51.0 BELL'S PALSY: Status: ACTIVE | Noted: 2017-09-04

## 2017-09-04 PROBLEM — B02.21 RAMSAY HUNT SYNDROME (GENICULATE HERPES ZOSTER): Status: ACTIVE | Noted: 2017-09-04

## 2017-09-04 LAB
ALBUMIN SERPL-MCNC: 3 G/DL (ref 3.5–5)
ALBUMIN/GLOB SERPL: 1 {RATIO} (ref 1.1–2.2)
ALP SERPL-CCNC: 72 U/L (ref 45–117)
ALT SERPL-CCNC: 41 U/L (ref 12–78)
ANION GAP SERPL CALC-SCNC: 9 MMOL/L (ref 5–15)
AST SERPL-CCNC: 16 U/L (ref 15–37)
ATRIAL RATE: 75 BPM
BASOPHILS # BLD: 0 K/UL (ref 0–0.1)
BASOPHILS NFR BLD: 0 % (ref 0–1)
BILIRUB DIRECT SERPL-MCNC: 0.3 MG/DL (ref 0–0.2)
BILIRUB SERPL-MCNC: 3.8 MG/DL (ref 0.2–1)
BUN SERPL-MCNC: 10 MG/DL (ref 6–20)
BUN/CREAT SERPL: 12 (ref 12–20)
CALCIUM SERPL-MCNC: 8.6 MG/DL (ref 8.5–10.1)
CALCULATED P AXIS, ECG09: 63 DEGREES
CALCULATED R AXIS, ECG10: -17 DEGREES
CALCULATED T AXIS, ECG11: 13 DEGREES
CHLORIDE SERPL-SCNC: 108 MMOL/L (ref 97–108)
CO2 SERPL-SCNC: 22 MMOL/L (ref 21–32)
CREAT SERPL-MCNC: 0.86 MG/DL (ref 0.7–1.3)
DIAGNOSIS, 93000: NORMAL
EOSINOPHIL # BLD: 0 K/UL (ref 0–0.4)
EOSINOPHIL NFR BLD: 0 % (ref 0–7)
ERYTHROCYTE [DISTWIDTH] IN BLOOD BY AUTOMATED COUNT: 13.5 % (ref 11.5–14.5)
GLOBULIN SER CALC-MCNC: 2.9 G/DL (ref 2–4)
GLUCOSE SERPL-MCNC: 113 MG/DL (ref 65–100)
HCT VFR BLD AUTO: 39.6 % (ref 36.6–50.3)
HGB BLD-MCNC: 14.1 G/DL (ref 12.1–17)
LYMPHOCYTES # BLD: 1.1 K/UL (ref 0.8–3.5)
LYMPHOCYTES NFR BLD: 10 % (ref 12–49)
MAGNESIUM SERPL-MCNC: 1.8 MG/DL (ref 1.6–2.4)
MCH RBC QN AUTO: 29.9 PG (ref 26–34)
MCHC RBC AUTO-ENTMCNC: 35.6 G/DL (ref 30–36.5)
MCV RBC AUTO: 84.1 FL (ref 80–99)
MONOCYTES # BLD: 0.5 K/UL (ref 0–1)
MONOCYTES NFR BLD: 5 % (ref 5–13)
NEUTS SEG # BLD: 9.1 K/UL (ref 1.8–8)
NEUTS SEG NFR BLD: 85 % (ref 32–75)
P-R INTERVAL, ECG05: 134 MS
PHOSPHATE SERPL-MCNC: 2.8 MG/DL (ref 2.6–4.7)
PLATELET # BLD AUTO: 199 K/UL (ref 150–400)
POTASSIUM SERPL-SCNC: 3.8 MMOL/L (ref 3.5–5.1)
PROT SERPL-MCNC: 5.9 G/DL (ref 6.4–8.2)
Q-T INTERVAL, ECG07: 410 MS
QRS DURATION, ECG06: 94 MS
QTC CALCULATION (BEZET), ECG08: 457 MS
RBC # BLD AUTO: 4.71 M/UL (ref 4.1–5.7)
SODIUM SERPL-SCNC: 139 MMOL/L (ref 136–145)
VENTRICULAR RATE, ECG03: 75 BPM
WBC # BLD AUTO: 10.8 K/UL (ref 4.1–11.1)

## 2017-09-04 PROCEDURE — 80048 BASIC METABOLIC PNL TOTAL CA: CPT | Performed by: INTERNAL MEDICINE

## 2017-09-04 PROCEDURE — 36415 COLL VENOUS BLD VENIPUNCTURE: CPT | Performed by: INTERNAL MEDICINE

## 2017-09-04 PROCEDURE — 85025 COMPLETE CBC W/AUTO DIFF WBC: CPT | Performed by: INTERNAL MEDICINE

## 2017-09-04 PROCEDURE — 83735 ASSAY OF MAGNESIUM: CPT | Performed by: INTERNAL MEDICINE

## 2017-09-04 PROCEDURE — 84100 ASSAY OF PHOSPHORUS: CPT | Performed by: INTERNAL MEDICINE

## 2017-09-04 PROCEDURE — 74011636637 HC RX REV CODE- 636/637: Performed by: PSYCHIATRY & NEUROLOGY

## 2017-09-04 PROCEDURE — 74011250636 HC RX REV CODE- 250/636: Performed by: SPECIALIST

## 2017-09-04 PROCEDURE — 74011000250 HC RX REV CODE- 250: Performed by: INTERNAL MEDICINE

## 2017-09-04 PROCEDURE — 74011250636 HC RX REV CODE- 250/636: Performed by: INTERNAL MEDICINE

## 2017-09-04 PROCEDURE — 80076 HEPATIC FUNCTION PANEL: CPT | Performed by: INTERNAL MEDICINE

## 2017-09-04 RX ORDER — PROMETHAZINE HYDROCHLORIDE 12.5 MG/1
12.5 TABLET ORAL
Qty: 20 TAB | Refills: 0 | Status: SHIPPED | OUTPATIENT
Start: 2017-09-04 | End: 2019-10-03

## 2017-09-04 RX ORDER — MECLIZINE HYDROCHLORIDE 25 MG/1
25 TABLET ORAL 2 TIMES DAILY
Qty: 30 TAB | Refills: 0 | Status: SHIPPED | OUTPATIENT
Start: 2017-09-04

## 2017-09-04 RX ORDER — ONDANSETRON 4 MG/1
4 TABLET, ORALLY DISINTEGRATING ORAL
Qty: 20 TAB | Refills: 0 | OUTPATIENT
Start: 2017-09-04 | End: 2019-10-03

## 2017-09-04 RX ORDER — PREDNISONE 10 MG/1
TABLET ORAL
Qty: 21 TAB | Refills: 0 | Status: SHIPPED | OUTPATIENT
Start: 2017-09-04

## 2017-09-04 RX ADMIN — PREDNISONE 20 MG: 20 TABLET ORAL at 08:10

## 2017-09-04 RX ADMIN — MECLIZINE HCL 25 MG: 12.5 TABLET ORAL at 09:10

## 2017-09-04 RX ADMIN — SODIUM CHLORIDE 100 ML/HR: 900 INJECTION, SOLUTION INTRAVENOUS at 06:43

## 2017-09-04 RX ADMIN — METOCLOPRAMIDE 5 MG: 5 INJECTION, SOLUTION INTRAMUSCULAR; INTRAVENOUS at 09:10

## 2017-09-04 RX ADMIN — FAMOTIDINE 20 MG: 10 INJECTION INTRAVENOUS at 09:10

## 2017-09-04 RX ADMIN — ERYTHROMYCIN: 5 OINTMENT OPHTHALMIC at 10:00

## 2017-09-04 RX ADMIN — Medication 10 ML: at 06:43

## 2017-09-04 RX ADMIN — ONDANSETRON 4 MG: 2 INJECTION INTRAMUSCULAR; INTRAVENOUS at 06:43

## 2017-09-04 NOTE — CONSULTS
Akash Carlson w[address]ers [place] [address]   [address] [facility], [address]   [address] [facility]       Name:  Jorgito Garcia   MR#:  [id number]   :  1965   Account #:  [de-identified]    Date of Consultation:  2017   Date of Adm:  2017       REQUESTING: Dr. Jorge Alberto Wheeler you for asking us to see this patient in neurologic consultation   regarding intractable nausea and vomiting, question [place] Sweeney   syndrome. HISTORY OF PRESENT ILLNESS: He is a pleasant 71-year-old   gentleman who is seen today with his brother and father at the   bedside. Chart and images are reviewed. In any regard, his story starts   about 3 weeks ago. He started to have what he calls an ear infection   on the right side. He has been followed by  of ENT. He noted that   he had pain in the right ear, was seen by ENT, noted to have vesicles   inside of the ear in the ear canal and was told that he had shingles in   the ear and his dad adds that he was told that it was a herpes-type   virus that was felt to be doing this. The patient notes that at that time   he started to have dizziness and this was described as imbalance. If   he moved too quickly he just felt unbalanced. He was not started on   any anti-virals at that time, but he was started on some prednisone 20   mg t.i.d. He was maintained on that, and he started to get a Bell palsy   on the right. The Bell palsy reached its shubham and has started to get   better. The prednisone was stopped abruptly about a week ago, and   after the prednisone was stopped the patient started to have increasing   imbalance and nausea and vomiting, reaching the point where he   presented to the emergency department because he was unable to   keep down food and water. He has been seen by GI and Surgery, and   he also was noted by his primary doctor to be a bit jaundiced. They   have been exploring possible etiologies for the jaundice.  Question for   me is whether or not some of this could be related to the [address]. The Bell palsy actually has been improving. He did have an MRI   of the brain done with FIESTA sequences and showed no   enhancement of the facial nerve. The patient does note he has had   some decrease in hearing. He has an upcoming ENT appointment   soon. He has had no other focal deficits. Never had anything like this   before. PAST MEDICAL HISTORY:   1. He has had a fall with a spinal contusion, had to have cervical   surgery at [address] [place] [address] several years back, had infected hardware and had to   have that hardware removed. 2. Scarlet fever. 3. Fractured his left knee. 4. Biopsy on the right kidney. MEDICATIONS:   1. Zofran. 2. Prilosec. 3. Zofran. 4. Erythromycin as an outpatient. Currently:   1. Lovenox. 2. Erythromycin. 3. Antivert. 4. Pepcid. 5. Reglan. 6. IV fluid. 7. Benadryl. 8. Zofran. ALLERGIES: NONE. SOCIAL HISTORY: He denies any substances. FAMILY HISTORY: He denies any major illnesses in the family, except   his dad has hypertension. REVIEW OF SYSTEMS: He notes he has been fine, except for this   nausea and vomiting. PHYSICAL EXAMINATION:   GENERAL: Pleasant gentleman, although he looks a bit   uncomfortable. He does note that he was able to eat some today, but   feeling a bit nauseated right now. VITAL SIGNS: He is afebrile, pulse 68, blood pressure 127/84, oxygen   saturation 98% on room air. HEENT: He has scleral icterus. Oropharynx clear and moist. In the   right ear he has what appears to be dried vesicles. HEART: He has a regular heart rate. EXTREMITIES: No edema. NEUROLOGIC: Awake, alert. He has full versions without nystagmus. He has a right peripheral 7th palsy. Tongue and palate are midline. No   pronation or drift. He resists fully in the upper and lower extremities in   all muscle groups to direct testing. Symmetrical reflexes. No ataxia. Sensory intact to primary modalities. MRI: As stated. LABORATORY ANALYSES: With normal magnesium. His protein and   albumin are a bit low. Liver functions are normal. CBC unremarkable. Thyroid function unremarkable. Fractionated bilirubin is elevated with   total, as well as direct slightly and indirect is elevated at 4.5. Haptoglobin normal. is normal.    IMPRESSION: Apparent Stacy Sweeney syndrome and certainly this   can cause some vertigo and nausea-type symptoms as noted above,   and certainly his increase in his symptomology seems to correlate with   the abrupt discontinuation of the prednisone, and we discussed that. Given that, I am going to go ahead and put him on 40 mg of   prednisone a day and certainly would keep him on that for a couple   days and then can go ahead and start to taper that on down and see if   that can gingerly help with some of his symptomology. No role for anti-  virals here as again the anti-viral role is controversial anyway. Certainly   also he is 3 weeks into this, and I do not think it would play a role. Even   if one wanted to argue that it may have benefit, it is too late. This   certainly, however, would have no role in the elevated bilirubin or the   jaundice; however, and so not sure whether that is playing a role or   not. I will leave that to my medicine colleagues. Will have the group   check back with him tomorrow. Will see how he is doing   symptomatically, and I would just continue to treat the symptoms. We   will see how the prednisone goes. We did discuss that the Bell palsy   will take time to get better, and certainly people with this type of   syndrome tend to have a more pronounced Bell palsy, and he may not   improve completely and did discuss that with him at length. He should   continue to have ENT followup once he is discharged. Thanks for your request for consultation.         Darion Larry MD      SLE / Davidson Morrell   D:  09/03/2017   22:03   T:  09/03/2017   23:30   Job #:  [id number]

## 2017-09-04 NOTE — DISCHARGE INSTRUCTIONS
HOSPITALIST DISCHARGE INSTRUCTIONS  NAME: Guillermo Whittington   :  1965   MRN:  [id number]     Date/Time:  2017 10:44 AM    ADMIT DATE: 2017     DISCHARGE DATE: 2017     ADMITTING DIAGNOSIS:  Right facial weakness, nausea, vomiting, elevated bilirubin    DISCHARGE DIAGNOSIS:  Stacy Hunt syndrome, Bell's palsy, Gilbert's disease    MEDICATIONS:  See after visit summary       · It is important that you take the medication exactly as they are prescribed. · Keep your medication in the bottles provided by the pharmacist and keep a list of the medication names, dosages, and times to be taken in your wallet. · Do not take other medications without consulting your doctor     Pain Management: per above medications    What to do at Home    Recommended diet:  Regular Diet    Recommended activity: Activity as tolerated    1) Return to the hospital if you feel worse    2) If you experience any of the following symptoms then please call your primary care physician or return to the emergency room if you cannot get hold of your doctor:  Fever, chills, nausea, vomiting, diarrhea, change in mentation, falling, bleeding, shortness of breath, chest pain, severe headache, severe abdominal pain,     3) Take medications as prescribed    4) Follow up with your doctors    Follow Up:   Follow-up Information     Follow up With Details Comments [address] [facility], [place] Schedule an appointment as soon as possible for a visit in 1 week  [address]  [address], [place] Schedule an appointment as soon as possible for a visit in 2 weeks  [facility] [address]  [phone]      Kinjal Moore MD Schedule an appointment as soon as possible for a visit in 2 weeks for calcified gallbladder [address] [facility]  [phone]              Information obtained by :  I understand that if any problems occur once I am at home I am to contact my physician. I understand and acknowledge receipt of the instructions indicated above. Physician's or R.N.'s Signature                                                                  Date/Time                                                                                                                                              Patient or Representative Signature                                                          Date/Time           Bell's Palsy: Care Instructions  Your Care Instructions    Bell's palsy is paralysis or weakness of the muscles on one side of the face. Often people with Bell's palsy have a droop on one side of the mouth and have trouble completely shutting the eye on the same side. Bell's palsy can also interfere with the sense of taste. These things happen when a nerve in your face becomes inflamed. Bell's palsy is not caused by a stroke. The cause of the nerve inflammation is not known. But some experts think that a virus may cause it. Because of this, doctors sometimes prescribe antiviral medicine to treat it. You also may get medicine to reduce swelling. Bell's palsy usually gets better on its own in a few weeks or months. Follow-up care is a key part of your treatment and safety. Be sure to make and go to all appointments, and call your doctor if you are having problems. It's also a good idea to know your test results and keep a list of the medicines you take. How can you care for yourself at home? · Take your medicines exactly as prescribed. Call your doctor if you think you are having a problem with your medicine. You will get more details on the specific medicines your doctor prescribes. · Use artificial tears or ointment if your eyes are too dry.  Bell's palsy can make your lower eyelid droop, causing a dry eye. · If you cannot completely close your eye, consider using an eye patch while you sleep. · Help yourself blink by using your finger to close and open your eyelid. This may help keep your eye moist.  · Wear glasses or goggles to keep dust and dirt out of your eye. · As feeling comes back to your face, massage your forehead, cheeks, and lips. Massage may make the muscles in your face stronger. · Brush and floss your teeth often to help prevent tooth decay. Bell's palsy can dry up the spit on one side of your mouth. This increases the risk of tooth decay. When should you call for help? Call 911 anytime you think you may need emergency care. For example, call if:  · You have symptoms of a stroke. These may include:  ¨ Sudden numbness, tingling, weakness, or loss of movement in your face, arm, or leg, especially on only one side of your body. ¨ Sudden vision changes. ¨ Sudden trouble speaking. ¨ Sudden confusion or trouble understanding simple statements. ¨ Sudden problems with walking or balance. ¨ A sudden, severe headache that is different from past headaches. Call your doctor now or seek immediate medical care if:  · You have numbness or weakness that spreads beyond one side of your face. · You have a skin rash or eye pain or redness, or light bothers your eyes. · You have a new or worse headache. Watch closely for changes in your health, and be sure to contact your doctor if:  · You do not get better as expected. Where can you learn more? Go to http://abdullahi-gallito.info/. Enter P168 in the search box to learn more about \"Bell's Palsy: Care Instructions. \"  Current as of: October 14, 2016  Content Version: 11.3  © 4698-4351 [employer] Care instructions adapted under license by [employer] (which disclaims liability or warranty for this information).  If you have questions about a medical condition or this instruction, always ask your healthcare professional. [address] any warranty or liability for your use of this information.

## 2017-09-04 NOTE — DISCHARGE SUMMARY
Akash Carlson [address] [place] [address]  [address]  [phone]    Physician Discharge Summary     Patient ID:  William Dos Santos  [id number]  18 y.o.  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 Patient had recent shingles and completed a course of anti-viral.  Neuro recommended a course of prednisone taper. Patient will f/u with his ENT doctor and Neurology    2) Intractable N/V: likely due to dizziness. Now improved. Cont medical management    3) Elevated Bili: was evaluated by GI (Dr. iMlton Duncan) and indicated that this was due to Gilbert's. No further w/u    4) Calcified gallbladder: incidental finding on abd U/S. Gen surg was following, recommended outpatient f/u for a possible elective cholecystectomy    4) Acute renal insufficiency:  Likely from n/v and intravascular volume depletion.  Resolved with IVF    PCP: Berhane Patel MD     Consults: GI, neuro, gen surg    Significant Diagnostic Studies: head MRI    Discharge Exam:  Physical Exam:    Gen: Well-developed, well-nourished, in no acute distress  HEENT:  Pink conjunctivae, PERRL, hearing intact to voice, moist mucous membranes  Neck: Supple, without masses, thyroid non-tender  Resp: No accessory muscle use, clear breath sounds without wheezes rales or rhonchi  Card: No murmurs, normal S1, S2 without thrills, bruits or peripheral edema  Abd:  Soft, non-tender, non-distended, normoactive bowel sounds are present, no palpable organomegaly and no detectable hernias  Lymph:  No cervical or inguinal adenopathy  Musc: No cyanosis or clubbing  Skin: No rashes or ulcers, skin turgor is good  Neuro:  Cranial nerves are grossly intact, right facial weakness, no focal motor weakness, follows commands appropriately  Psych:  Good insight, oriented to person, place and time, alert    Disposition: home  Discharge Condition: Stable    Patient Instructions:   Current Discharge Medication List      START taking these medications    Details   predniSONE (DELTASONE) 10 mg tablet Take 40mg (4 tabs) daily for 3 days, then 20mg (2 tabs) daily for 3 days, then 10mg daily for 3 days  Qty: 21 Tab, Refills: 0      promethazine (PHENERGAN) 12.5 mg tablet Take 1 Tab by mouth every six (6) hours as needed for Nausea. Qty: 20 Tab, Refills: 0         CONTINUE these medications which have CHANGED    Details   meclizine (ANTIVERT) 25 mg tablet Take 1 Tab by mouth two (2) times a day. Qty: 30 Tab, Refills: 0      ondansetron (ZOFRAN ODT) 4 mg disintegrating tablet Take 1 Tab by mouth every eight (8) hours as needed for Nausea. Qty: 20 Tab, Refills: 0         CONTINUE these medications which have NOT CHANGED    Details   Omeprazole delayed release (PRILOSEC D/R) 20 mg tablet Take 20 mg by mouth daily. erythromycin (ILOTYCIN) ophthalmic ointment Administer  to right eye two (2) times a day.            Activity: Activity as tolerated  Diet: Regular Diet  Wound Care: None needed    Follow-up with  Follow-up Information     Follow up With Details Comments [address] [facility] Schedule an appointment as soon as possible for a visit in 1 week  [id number] 18 02 19 1900 [address] [place] [address]  [phone]      Ana Paula Quintero MD Schedule an appointment as soon as possible for a visit in 2 weeks  [address] [place] [address]  [phone]            Follow-up tests/labs none    Signed:  Beronica Veras MD  9/4/2017  10:45 AM    I spent 35 min on discharge

## 2017-09-04 NOTE — PROGRESS NOTES
Surgery    Pt denies abdominal pain, no further vomiting yesterday.   AF, VSS  Abdomen soft, nttp    Plan: follow up in office for recommendations regarding calcified gallbladder and cholecystectomy

## 2017-09-04 NOTE — ROUTINE PROCESS
Bedside and Verbal shift change report given to [address]) by Dixon Gonsalves (offgoing nurse). Report included the following information SBAR, Kardex, Intake/Output, MAR, Accordion and Recent Results.

## 2017-09-04 NOTE — PROGRESS NOTES
Neuro:  Afebrile and VSS. Speech is fairly fluent and articulate. Retains mild R peripheral facial weakness. Sensory ok. Oral exam ok. EOMI w/o nystagnus PERRL APD negative. External orbital appearance ok and not impressed with jaundice this AM. Able to swallow now and N/V abating. Cerebellar FNF and TF on spot. Toes plantar. Brain MRI as noted normal.     R Stacy Sweeney syndrome. Improving steadily at this point, can't think of anything else to add to overall care picture. Thanks.  lynn

## 2017-09-07 ENCOUNTER — HOSPITAL ENCOUNTER (EMERGENCY)
Age: 52
Discharge: HOME | End: 2017-09-07
Attending: EMERGENCY MEDICINE
Payer: COMMERCIAL

## 2017-09-07 ENCOUNTER — APPOINTMENT (OUTPATIENT)
Dept: GENERAL RADIOLOGY | Age: 52
End: 2017-09-07
Attending: PHYSICIAN ASSISTANT
Payer: COMMERCIAL

## 2017-09-07 VITALS
RESPIRATION RATE: 14 BRPM | SYSTOLIC BLOOD PRESSURE: 138 MMHG | HEIGHT: 69 IN | WEIGHT: 150 LBS | DIASTOLIC BLOOD PRESSURE: 87 MMHG | HEART RATE: 75 BPM | TEMPERATURE: 98.5 F | BODY MASS INDEX: 22.22 KG/M2 | OXYGEN SATURATION: 95 %

## 2017-09-07 DIAGNOSIS — B02.21 RAMSAY HUNT SYNDROME (GENICULATE HERPES ZOSTER): ICD-10-CM

## 2017-09-07 DIAGNOSIS — R11.2 NAUSEA AND VOMITING IN ADULT: Primary | ICD-10-CM

## 2017-09-07 LAB
ALBUMIN SERPL-MCNC: 3.7 G/DL (ref 3.5–5)
ALBUMIN/GLOB SERPL: 1 {RATIO} (ref 1.1–2.2)
ALP SERPL-CCNC: 90 U/L (ref 45–117)
ALT SERPL-CCNC: 65 U/L (ref 12–78)
ANION GAP SERPL CALC-SCNC: 14 MMOL/L (ref 5–15)
APPEARANCE UR: ABNORMAL
AST SERPL-CCNC: 20 U/L (ref 15–37)
BACTERIA URNS QL MICRO: NEGATIVE /HPF
BASOPHILS # BLD: 0 K/UL (ref 0–0.1)
BASOPHILS NFR BLD: 0 % (ref 0–1)
BILIRUB SERPL-MCNC: 3.3 MG/DL (ref 0.2–1)
BILIRUB UR QL: NEGATIVE
BUN SERPL-MCNC: 15 MG/DL (ref 6–20)
BUN/CREAT SERPL: 16 (ref 12–20)
CALCIUM SERPL-MCNC: 9.5 MG/DL (ref 8.5–10.1)
CHLORIDE SERPL-SCNC: 100 MMOL/L (ref 97–108)
CO2 SERPL-SCNC: 21 MMOL/L (ref 21–32)
COLOR UR: ABNORMAL
CREAT SERPL-MCNC: 0.93 MG/DL (ref 0.7–1.3)
EOSINOPHIL # BLD: 0.1 K/UL (ref 0–0.4)
EOSINOPHIL NFR BLD: 1 % (ref 0–7)
EPITH CASTS URNS QL MICRO: ABNORMAL /LPF
ERYTHROCYTE [DISTWIDTH] IN BLOOD BY AUTOMATED COUNT: 13.6 % (ref 11.5–14.5)
GLOBULIN SER CALC-MCNC: 3.7 G/DL (ref 2–4)
GLUCOSE SERPL-MCNC: 135 MG/DL (ref 65–100)
GLUCOSE UR STRIP.AUTO-MCNC: NEGATIVE MG/DL
HCT VFR BLD AUTO: 45.8 % (ref 36.6–50.3)
HGB BLD-MCNC: 16.6 G/DL (ref 12.1–17)
HGB UR QL STRIP: ABNORMAL
HYALINE CASTS URNS QL MICRO: ABNORMAL /LPF (ref 0–5)
KETONES UR QL STRIP.AUTO: NEGATIVE MG/DL
LEUKOCYTE ESTERASE UR QL STRIP.AUTO: ABNORMAL
LIPASE SERPL-CCNC: 353 U/L (ref 73–393)
LYMPHOCYTES # BLD: 1.9 K/UL (ref 0.8–3.5)
LYMPHOCYTES NFR BLD: 11 % (ref 12–49)
MCH RBC QN AUTO: 30.5 PG (ref 26–34)
MCHC RBC AUTO-ENTMCNC: 36.2 G/DL (ref 30–36.5)
MCV RBC AUTO: 84 FL (ref 80–99)
MONOCYTES # BLD: 1.3 K/UL (ref 0–1)
MONOCYTES NFR BLD: 7 % (ref 5–13)
NEUTS SEG # BLD: 14.7 K/UL (ref 1.8–8)
NEUTS SEG NFR BLD: 81 % (ref 32–75)
NITRITE UR QL STRIP.AUTO: NEGATIVE
PH UR STRIP: 7 [PH] (ref 5–8)
PLATELET # BLD AUTO: 185 K/UL (ref 150–400)
POTASSIUM SERPL-SCNC: 3.5 MMOL/L (ref 3.5–5.1)
PROT SERPL-MCNC: 7.4 G/DL (ref 6.4–8.2)
PROT UR STRIP-MCNC: NEGATIVE MG/DL
RBC # BLD AUTO: 5.45 M/UL (ref 4.1–5.7)
RBC #/AREA URNS HPF: ABNORMAL /HPF (ref 0–5)
SODIUM SERPL-SCNC: 135 MMOL/L (ref 136–145)
SP GR UR REFRACTOMETRY: 1.01 (ref 1–1.03)
UROBILINOGEN UR QL STRIP.AUTO: 0.2 EU/DL (ref 0.2–1)
WBC # BLD AUTO: 18 K/UL (ref 4.1–11.1)
WBC URNS QL MICRO: ABNORMAL /HPF (ref 0–4)

## 2017-09-07 PROCEDURE — 74022 RADEX COMPL AQT ABD SERIES: CPT

## 2017-09-07 PROCEDURE — 80053 COMPREHEN METABOLIC PANEL: CPT | Performed by: EMERGENCY MEDICINE

## 2017-09-07 PROCEDURE — 99284 EMERGENCY DEPT VISIT MOD MDM: CPT

## 2017-09-07 PROCEDURE — 96375 TX/PRO/DX INJ NEW DRUG ADDON: CPT

## 2017-09-07 PROCEDURE — 96361 HYDRATE IV INFUSION ADD-ON: CPT

## 2017-09-07 PROCEDURE — 85025 COMPLETE CBC W/AUTO DIFF WBC: CPT | Performed by: EMERGENCY MEDICINE

## 2017-09-07 PROCEDURE — 36415 COLL VENOUS BLD VENIPUNCTURE: CPT | Performed by: EMERGENCY MEDICINE

## 2017-09-07 PROCEDURE — 83690 ASSAY OF LIPASE: CPT | Performed by: EMERGENCY MEDICINE

## 2017-09-07 PROCEDURE — 74011250636 HC RX REV CODE- 250/636: Performed by: PHYSICIAN ASSISTANT

## 2017-09-07 PROCEDURE — 81001 URINALYSIS AUTO W/SCOPE: CPT | Performed by: PHYSICIAN ASSISTANT

## 2017-09-07 PROCEDURE — 96374 THER/PROPH/DIAG INJ IV PUSH: CPT

## 2017-09-07 RX ORDER — PROCHLORPERAZINE EDISYLATE 5 MG/ML
10 INJECTION INTRAMUSCULAR; INTRAVENOUS
Status: COMPLETED | OUTPATIENT
Start: 2017-09-07 | End: 2017-09-07

## 2017-09-07 RX ORDER — PROCHLORPERAZINE 25 MG
25 SUPPOSITORY, RECTAL RECTAL
Qty: 10 SUPPOSITORY | Refills: 0 | Status: SHIPPED | OUTPATIENT
Start: 2017-09-07 | End: 2017-09-14

## 2017-09-07 RX ORDER — MORPHINE SULFATE 2 MG/ML
4 INJECTION, SOLUTION INTRAMUSCULAR; INTRAVENOUS
Status: COMPLETED | OUTPATIENT
Start: 2017-09-07 | End: 2017-09-07

## 2017-09-07 RX ORDER — PROCHLORPERAZINE MALEATE 10 MG
10 TABLET ORAL
Qty: 20 TAB | Refills: 0 | Status: SHIPPED | OUTPATIENT
Start: 2017-09-07 | End: 2019-10-03

## 2017-09-07 RX ADMIN — SODIUM CHLORIDE 1000 ML: 900 INJECTION, SOLUTION INTRAVENOUS at 10:39

## 2017-09-07 RX ADMIN — PROCHLORPERAZINE EDISYLATE 10 MG: 5 INJECTION INTRAMUSCULAR; INTRAVENOUS at 10:43

## 2017-09-07 RX ADMIN — MORPHINE SULFATE 4 MG: 2 INJECTION, SOLUTION INTRAMUSCULAR; INTRAVENOUS at 10:43

## 2017-09-07 NOTE — ED TRIAGE NOTES
N/v since this morning. Recent [facility] admin. Left flank pain after eating. Some diarrhea last Tuesday. Left leg shaking, unknown.

## 2017-09-07 NOTE — DISCHARGE INSTRUCTIONS
Nausea and Vomiting: Care Instructions  Your Care Instructions    When you are nauseated, you may feel weak and sweaty and notice a lot of saliva in your mouth. Nausea often leads to vomiting. Most of the time you do not need to worry about nausea and vomiting, but they can be signs of other illnesses. Two common causes of nausea and vomiting are stomach flu and food poisoning. Nausea and vomiting from viral stomach flu will usually start to improve within 24 hours. Nausea and vomiting from food poisoning may last from 12 to 48 hours. The doctor has checked you carefully, but problems can develop later. If you notice any problems or new symptoms, get medical treatment right away. Follow-up care is a key part of your treatment and safety. Be sure to make and go to all appointments, and call your doctor if you are having problems. It's also a good idea to know your test results and keep a list of the medicines you take. How can you care for yourself at home? · To prevent dehydration, drink plenty of fluids, enough so that your urine is light yellow or clear like water. Choose water and other caffeine-free clear liquids until you feel better. If you have kidney, heart, or liver disease and have to limit fluids, talk with your doctor before you increase the amount of fluids you drink. · Rest in bed until you feel better. · When you are able to eat, try clear soups, mild foods, and liquids until all symptoms are gone for 12 to 48 hours. Other good choices include dry toast, crackers, cooked cereal, and gelatin dessert, such as Jell-O. When should you call for help? Call 911 anytime you think you may need emergency care. For example, call if:  · You passed out (lost consciousness). Call your doctor now or seek immediate medical care if:  · You have symptoms of dehydration, such as:  ¨ Dry eyes and a dry mouth. ¨ Passing only a little dark urine.   ¨ Feeling thirstier than usual.  · You have new or worsening belly pain. · You have a new or higher fever. · You vomit blood or what looks like coffee grounds. Watch closely for changes in your health, and be sure to contact your doctor if:  · You have ongoing nausea and vomiting. · Your vomiting is getting worse. · Your vomiting lasts longer than 2 days. · You are not getting better as expected. Where can you learn more? Go to http://abdullahi-gallito.info/. Enter [id number] in the search box to learn more about \"Nausea and Vomiting: Care Instructions. \"  Current as of: March 20, 2017  Content Version: 11.3  © 7606-3960 [employer]. Care instructions adapted under license by [employer] (which disclaims liability or warranty for this information). If you have questions about a medical condition or this instruction, always ask your healthcare professional. [address] any warranty or liability for your use of this information. We hope that we have addressed all of your medical concerns. The examination and treatment you received in the Emergency Department were for an emergent problem and were not intended as complete care. It is important that you follow up with your healthcare provider(s) for ongoing care. If your symptoms worsen or do not improve as expected, and you are unable to reach your usual health care provider(s), you should return to the Emergency Department. Today's healthcare is undergoing tremendous change, and patient satisfaction surveys are one of the many tools to assess the quality of medical care. You may receive a survey from the [employer] organization regarding your experience in the Emergency Department. I hope that your experience has been completely positive, particularly the medical care that I provided. As such, please participate in the survey; anything less than excellent does not meet my expectations or intentions.         [address] [facility] participate in nationally recognized quality of care measures. If your blood pressure is greater than 120/80, as reported below, we urge that you seek medical care to address the potential of high blood pressure, commonly known as hypertension. Hypertension can be hereditary or can be caused by certain medical conditions, pain, stress, or \"white coat syndrome. \"       Please make an appointment with your health care provider(s) for follow up of your Emergency Department visit. VITALS:   Patient Vitals for the past 8 hrs:   Temp Pulse Resp BP SpO2   09/07/17 1145 - 75 - 125/78 96 %   09/07/17 1130 - 81 14 129/83 97 %   09/07/17 1045 - - - 114/87 97 %   09/07/17 0820 98.5 °F (36.9 °C) (!) 102 16 127/89 98 %          Thank you for allowing us to provide you with medical care today. We realize that you have many choices for your emergency care needs. Please choose us in the future for any continued health care needs. Aurora Baltazar, [address]: [phone]            Recent Results (from the past 24 hour(s))   METABOLIC PANEL, COMPREHENSIVE    Collection Time: 09/07/17  8:53 AM   Result Value Ref Range    Sodium 135 (L) 136 - 145 mmol/L    Potassium 3.5 3.5 - 5.1 mmol/L    Chloride 100 97 - 108 mmol/L    CO2 21 21 - 32 mmol/L    Anion gap 14 5 - 15 mmol/L    Glucose 135 (H) 65 - 100 mg/dL    BUN 15 6 - 20 MG/DL    Creatinine 0.93 0.70 - 1.30 MG/DL    BUN/Creatinine ratio 16 12 - 20      GFR est AA >60 >60 ml/min/1.73m2    GFR est non-AA >60 >60 ml/min/1.73m2    Calcium 9.5 8.5 - 10.1 MG/DL    Bilirubin, total 3.3 (H) 0.2 - 1.0 MG/DL    ALT (SGPT) 65 12 - 78 U/L    AST (SGOT) 20 15 - 37 U/L    Alk.  phosphatase 90 45 - 117 U/L    Protein, total 7.4 6.4 - 8.2 g/dL    Albumin 3.7 3.5 - 5.0 g/dL    Globulin 3.7 2.0 - 4.0 g/dL    A-G Ratio 1.0 (L) 1.1 - 2.2     CBC WITH AUTOMATED DIFF    Collection Time: 09/07/17  8:53 AM Result Value Ref Range    WBC 18.0 (H) 4.1 - 11.1 K/uL    RBC 5.45 4.10 - 5.70 M/uL    HGB 16.6 12.1 - 17.0 g/dL    HCT 45.8 36.6 - 50.3 %    MCV 84.0 80.0 - 99.0 FL    MCH 30.5 26.0 - 34.0 PG    MCHC 36.2 30.0 - 36.5 g/dL    RDW 13.6 11.5 - 14.5 %    PLATELET 908 730 - 748 K/uL    NEUTROPHILS 81 (H) 32 - 75 %    LYMPHOCYTES 11 (L) 12 - 49 %    MONOCYTES 7 5 - 13 %    EOSINOPHILS 1 0 - 7 %    BASOPHILS 0 0 - 1 %    ABS. NEUTROPHILS 14.7 (H) 1.8 - 8.0 K/UL    ABS. LYMPHOCYTES 1.9 0.8 - 3.5 K/UL    ABS. MONOCYTES 1.3 (H) 0.0 - 1.0 K/UL    ABS. EOSINOPHILS 0.1 0.0 - 0.4 K/UL    ABS. BASOPHILS 0.0 0.0 - 0.1 K/UL   LIPASE    Collection Time: 09/07/17  8:53 AM   Result Value Ref Range    Lipase 353 73 - 393 U/L   URINALYSIS W/MICROSCOPIC    Collection Time: 09/07/17 12:12 PM   Result Value Ref Range    Color YELLOW/STRAW      Appearance CLOUDY (A) CLEAR      Specific gravity 1.008 1.003 - 1.030      pH (UA) 7.0 5.0 - 8.0      Protein NEGATIVE  NEG mg/dL    Glucose NEGATIVE  NEG mg/dL    Ketone NEGATIVE  NEG mg/dL    Bilirubin NEGATIVE  NEG      Blood SMALL (A) NEG      Urobilinogen 0.2 0.2 - 1.0 EU/dL    Nitrites NEGATIVE  NEG      Leukocyte Esterase SMALL (A) NEG      WBC 5-10 0 - 4 /hpf    RBC 0-5 0 - 5 /hpf    Epithelial cells FEW FEW /lpf    Bacteria NEGATIVE  NEG /hpf    Hyaline cast 0-2 0 - 5 /lpf       Xr Abd Acute W 1 V Chest    Result Date: 9/7/2017  EXAM:  XR ABD ACUTE W 1 V CHEST INDICATION:  Left flank pain, nausea, vomiting, diarrhea COMPARISON: 9/1/2017. FINDINGS: The upright chest radiograph demonstrates clear lungs and normal cardiac and mediastinal contours. There is no pleural effusion or free air under the diaphragm. Supine and decubitus views of the abdomen demonstrate a nonobstructive bowel gas pattern. There is no free intraperitoneal air. No soft tissue masses or pathologic calcifications are identified. The bones are within normal limits. IMPRESSION: No acute abnormality.

## 2017-09-07 NOTE — ED PROVIDER NOTES
HPI Comments: Blanco Giordano is a 47 y.o. male who presents ambulatory with his brother to the ED with a c/o continued nausea and vomiting. Pt was admitted 4 weeks ago for shingles to his right ear with [place] Hunt syndrome. He started 2 weeks ago with nausea, vomiting and dizziness. He was admitted last week by this provider for intractable nausea and vomiting, jaundice, hyperbilirubinemia and dehydration with a 20-25 lb weight loss. Pt notes he was discharged 9/4/17 and was put on steroids again which he is concerned is making him sick. He notes he stands and gets more dizzy, then vomits. Pt also reports continued left flank pain that he has been having x 3 weeks. His ct last week was negative for left sided pathology other than punctate renal calculi. Pt notes he had been taking his phenergan without relief of his sx and vomited x 10 since yesterday. Pt reports constipation as well. He denies f/c, and his jaundice is improving. He specifically denies any chest pain, urinary sx, shortness of breath, headache, rash, diarrhea, sweating or weight loss. PCP: Yordan Truong MD  PMHx significant for: Past Medical History:  No date: H/O scarlet fever  No date: Herpes zoster virus infection of face and ear *      Comment: 8/2017: With facial weakness on right. No date: Ill-defined condition      Comment: spinal contusion  No date: Ill-defined condition      Comment: broken bone in left knee  PSHx significant for: Past Surgical History:  No date: HX OTHER SURGICAL      Comment: surgery on neck  No date: HX UROLOGICAL Right      Comment: surgery/biopsy on right kidney  Social Hx: Tobacco: denies EtOH: denies  Illicit drug use: denies    There are no further complaints or symptoms at this time. The history is provided by the patient. Past Medical History:   Diagnosis Date    H/O scarlet fever     Herpes zoster virus infection of face and ear nerves     8/2017: With facial weakness on right.     Ill-defined condition     spinal contusion    Ill-defined condition     broken bone in left knee       Past Surgical History:   Procedure Laterality Date    HX OTHER SURGICAL      surgery on neck    HX UROLOGICAL Right     surgery/biopsy on right kidney         Family History:   Problem Relation Age of Onset    Hypertension Father        Social History     Social History    Marital status: SINGLE     Spouse name: N/A    Number of children: N/A    Years of education: N/A     Occupational History    Not on file. Social History Main Topics    Smoking status: Never Smoker    Smokeless tobacco: Never Used    Alcohol use No    Drug use: No    Sexual activity: Not on file     Other Topics Concern    Not on file     Social History Narrative         ALLERGIES: Review of patient's allergies indicates no known allergies. Review of Systems   Constitutional: Negative for chills and fever. HENT: Negative for congestion, rhinorrhea, sneezing and sore throat. Eyes: Negative for redness and visual disturbance. Respiratory: Negative for shortness of breath. Cardiovascular: Negative for chest pain and leg swelling. Gastrointestinal: Positive for abdominal pain, constipation, nausea and vomiting. Negative for diarrhea. Genitourinary: Negative for difficulty urinating and frequency. Musculoskeletal: Negative for back pain, myalgias and neck stiffness. Skin: Negative for rash. Neurological: Negative for dizziness, syncope, weakness and headaches. Hematological: Negative for adenopathy. Vitals:    09/07/17 0820 09/07/17 1045 09/07/17 1130 09/07/17 1145   BP: 127/89 114/87 129/83 125/78   Pulse: (!) 102  81 75   Resp: 16  14    Temp: 98.5 °F (36.9 °C)      SpO2: 98% 97% 97% 96%   Weight: 68 kg (150 lb)      Height: 5' 9\" (1.753 m)               Physical Exam   Constitutional: He is oriented to person, place, and time. He appears well-developed and well-nourished. No distress.    HENT:   Head: Normocephalic and atraumatic. Right Ear: External ear normal.   Left Ear: External ear normal.   Nose: Nose normal.   Mouth/Throat: No oropharyngeal exudate. Mm dry, slight right facial droop. Resolving swelling/ lesions to right ear from shingles. Eyes: EOM are normal. Pupils are equal, round, and reactive to light. Right eye exhibits no discharge. Left eye exhibits no discharge. Scleral icterus is present. Slight scleral icterus   Neck: Neck supple. Cardiovascular: Normal rate, regular rhythm, normal heart sounds and intact distal pulses. Exam reveals no gallop and no friction rub. No murmur heard. Pulmonary/Chest: Effort normal and breath sounds normal. No stridor. No respiratory distress. He has no wheezes. He has no rales. He exhibits no tenderness. Abdominal: Soft. Bowel sounds are normal. He exhibits no distension and no mass. There is tenderness. There is no rebound and no guarding. Diffuse left flank/ llq TTP without rebounding or guarding no cvat   Musculoskeletal: Normal range of motion. He exhibits no edema, tenderness or deformity. Neurological: He is alert and oriented to person, place, and time. No cranial nerve deficit. Coordination normal.   Skin: No rash noted. No erythema. No pallor. Psychiatric: He has a normal mood and affect. His behavior is normal.   Nursing note and vitals reviewed.        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 He is in agreement with plan and will see pt  Alonso Amaya. TYRESE Baltazar    12:43   Pt feeling better. No acute findings on labs/ imaging. Reviewed with pt and family by Dr Yung Vang. Will po challenge pt  Alonso Baltazar PA-C    1:32 PM   Pt tolerating PO fluids. Will change rx to compazine as pt seems to have improved sx with compazine  Aravind Baltazar PA-C    LABORATORY TESTS:  Recent Results (from the past 12 hour(s))   METABOLIC PANEL, COMPREHENSIVE    Collection Time: 09/07/17  8:53 AM   Result Value Ref Range    Sodium 135 (L) 136 - 145 mmol/L    Potassium 3.5 3.5 - 5.1 mmol/L    Chloride 100 97 - 108 mmol/L    CO2 21 21 - 32 mmol/L    Anion gap 14 5 - 15 mmol/L    Glucose 135 (H) 65 - 100 mg/dL    BUN 15 6 - 20 MG/DL    Creatinine 0.93 0.70 - 1.30 MG/DL    BUN/Creatinine ratio 16 12 - 20      GFR est AA >60 >60 ml/min/1.73m2    GFR est non-AA >60 >60 ml/min/1.73m2    Calcium 9.5 8.5 - 10.1 MG/DL    Bilirubin, total 3.3 (H) 0.2 - 1.0 MG/DL    ALT (SGPT) 65 12 - 78 U/L    AST (SGOT) 20 15 - 37 U/L    Alk. phosphatase 90 45 - 117 U/L    Protein, total 7.4 6.4 - 8.2 g/dL    Albumin 3.7 3.5 - 5.0 g/dL    Globulin 3.7 2.0 - 4.0 g/dL    A-G Ratio 1.0 (L) 1.1 - 2.2     CBC WITH AUTOMATED DIFF    Collection Time: 09/07/17  8:53 AM   Result Value Ref Range    WBC 18.0 (H) 4.1 - 11.1 K/uL    RBC 5.45 4.10 - 5.70 M/uL    HGB 16.6 12.1 - 17.0 g/dL    HCT 45.8 36.6 - 50.3 %    MCV 84.0 80.0 - 99.0 FL    MCH 30.5 26.0 - 34.0 PG    MCHC 36.2 30.0 - 36.5 g/dL    RDW 13.6 11.5 - 14.5 %    PLATELET 371 390 - 835 K/uL    NEUTROPHILS 81 (H) 32 - 75 %    LYMPHOCYTES 11 (L) 12 - 49 %    MONOCYTES 7 5 - 13 %    EOSINOPHILS 1 0 - 7 %    BASOPHILS 0 0 - 1 %    ABS. NEUTROPHILS 14.7 (H) 1.8 - 8.0 K/UL    ABS. LYMPHOCYTES 1.9 0.8 - 3.5 K/UL    ABS. MONOCYTES 1.3 (H) 0.0 - 1.0 K/UL    ABS. EOSINOPHILS 0.1 0.0 - 0.4 K/UL    ABS.  BASOPHILS 0.0 0.0 - 0.1 K/UL LIPASE    Collection Time: 09/07/17  8:53 AM   Result Value Ref Range    Lipase 353 73 - 393 U/L   URINALYSIS W/MICROSCOPIC    Collection Time: 09/07/17 12:12 PM   Result Value Ref Range    Color YELLOW/STRAW      Appearance CLOUDY (A) CLEAR      Specific gravity 1.008 1.003 - 1.030      pH (UA) 7.0 5.0 - 8.0      Protein NEGATIVE  NEG mg/dL    Glucose NEGATIVE  NEG mg/dL    Ketone NEGATIVE  NEG mg/dL    Bilirubin NEGATIVE  NEG      Blood SMALL (A) NEG      Urobilinogen 0.2 0.2 - 1.0 EU/dL    Nitrites NEGATIVE  NEG      Leukocyte Esterase SMALL (A) NEG      WBC 5-10 0 - 4 /hpf    RBC 0-5 0 - 5 /hpf    Epithelial cells FEW FEW /lpf    Bacteria NEGATIVE  NEG /hpf    Hyaline cast 0-2 0 - 5 /lpf       IMAGING RESULTS:    Xr Abd Acute W 1 V Chest    Result Date: 9/7/2017  EXAM:  XR ABD ACUTE W 1 V CHEST INDICATION:  Left flank pain, nausea, vomiting, diarrhea COMPARISON: 9/1/2017. FINDINGS: The upright chest radiograph demonstrates clear lungs and normal cardiac and mediastinal contours. There is no pleural effusion or free air under the diaphragm. Supine and decubitus views of the abdomen demonstrate a nonobstructive bowel gas pattern. There is no free intraperitoneal air. No soft tissue masses or pathologic calcifications are identified. The bones are within normal limits. IMPRESSION: No acute abnormality. MEDICATIONS GIVEN:  Medications   sodium chloride 0.9 % bolus infusion 1,000 mL (0 mL IntraVENous IV Completed 9/7/17 1139)   prochlorperazine (COMPAZINE) injection 10 mg (10 mg IntraVENous Given 9/7/17 1043)   sodium chloride 0.9 % bolus infusion 1,000 mL (0 mL IntraVENous IV Completed 9/7/17 1139)   morphine injection 4 mg (4 mg IntraVENous Given 9/7/17 1043)       IMPRESSION:  1. Nausea and vomiting in adult    2. 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    Current Discharge Medication List      START taking these medications    Details   prochlorperazine (COMPAZINE) 10 mg tablet Take 1 Tab by mouth every eight (8) hours as needed for Nausea. Qty: 20 Tab, Refills: 0      prochlorperazine (PROCHLORPERAZINE) 25 mg suppository Insert 1 Suppository into rectum every twelve (12) hours as needed for Nausea for up to 7 days. Qty: 10 Suppository, Refills: 0         CONTINUE these medications which have NOT CHANGED    Details   meclizine (ANTIVERT) 25 mg tablet Take 1 Tab by mouth two (2) times a day. Qty: 30 Tab, Refills: 0      ondansetron (ZOFRAN ODT) 4 mg disintegrating tablet Take 1 Tab by mouth every eight (8) hours as needed for Nausea. Qty: 20 Tab, Refills: 0      predniSONE (DELTASONE) 10 mg tablet Take 40mg (4 tabs) daily for 3 days, then 20mg (2 tabs) daily for 3 days, then 10mg daily for 3 days  Qty: 21 Tab, Refills: 0      promethazine (PHENERGAN) 12.5 mg tablet Take 1 Tab by mouth every six (6) hours as needed for Nausea. Qty: 20 Tab, Refills: 0      Omeprazole delayed release (PRILOSEC D/R) 20 mg tablet Take 20 mg by mouth daily. erythromycin (ILOTYCIN) ophthalmic ointment Administer  to right eye two (2) times a day. 2.   Follow-up Information     Follow up With Details Comments [address] [place] [address], MD Schedule an appointment as soon as possible for a visit 2-4 days for recheck [address] [place] [address]  [phone]          Return to ED if worse     1:32 PM  Pt has been reexamined. Pt has no new complaints, changes or physical findings. Care plan outlined and precautions discussed. All available results were reviewed with pt. All medications were reviewed with pt. All of pt's questions and concerns were addressed. Pt agrees to F/U as instructed and agrees to return to ED upon further deterioration. Pt is ready to go home.   AMALIA Ziegler

## 2017-09-22 ENCOUNTER — OFFICE VISIT (OUTPATIENT)
Dept: NEUROLOGY | Age: 52
End: 2017-09-22

## 2017-09-22 VITALS
DIASTOLIC BLOOD PRESSURE: 78 MMHG | HEART RATE: 71 BPM | SYSTOLIC BLOOD PRESSURE: 110 MMHG | HEIGHT: 69 IN | RESPIRATION RATE: 17 BRPM | TEMPERATURE: 97.8 F | OXYGEN SATURATION: 99 % | WEIGHT: 141 LBS | BODY MASS INDEX: 20.88 KG/M2

## 2017-09-22 DIAGNOSIS — B02.21 RAMSAY HUNT SYNDROME (GENICULATE HERPES ZOSTER): Primary | ICD-10-CM

## 2017-09-22 DIAGNOSIS — H81.91 VESTIBULOPATHY, RIGHT: ICD-10-CM

## 2017-09-22 NOTE — MR AVS SNAPSHOT
Visit Information Date & Time Provider Department Dept. Phone Encounter #  
 9/22/2017 11:00 AM Ivonne Mishra MD 3 [facility] [phone] [id number] Follow-up Instructions Return in about 8 weeks (around 11/17/2017). Upcoming Health Maintenance Date Due Pneumococcal 19-64 Highest Risk (1 of 3 - PCV13) 7/10/1985 DTaP/Tdap/Td series (1 - Tdap) 7/10/1987 FOBT Q 1 YEAR AGE 50-75 7/10/2016 INFLUENZA AGE 9 TO ADULT 8/1/2017 Allergies as of 9/22/2017  Review Complete On: 9/22/2017 By: Ivonne Mishra MD  
 No Known Allergies Current Immunizations  Never Reviewed No immunizations on file. Not reviewed this visit You Were Diagnosed With   
  
 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 
ICD-9-CM: 053.11 Vestibulopathy, right     ICD-10-CM: H81.91 
ICD-9-CM: 388. 9 Vitals BP Pulse Temp Resp Height(growth percentile) Weight(growth percentile) 110/78 71 97.8 °F (36.6 °C) 17 5' 9\" (1.753 m) 141 lb (64 kg) SpO2 BMI Smoking Status 99% 20.82 kg/m2 Never Smoker Vitals History BMI and BSA Data Body Mass Index Body Surface Area  
 20.82 kg/m 2 1.77 m 2 Preferred Pharmacy Pharmacy Name Phone [facility] [address] [place] [address] [phone] Your Updated Medication List  
  
   
This list is accurate as of: 9/22/17 11:37 AM.  Always use your most recent med list.  
  
  
  
  
 erythromycin ophthalmic ointment Commonly known as:  ILOTYCIN Administer  to right eye two (2) times a day. meclizine 25 mg tablet Commonly known as:  ANTIVERT Take 1 Tab by mouth two (2) times a day. Omeprazole delayed release 20 mg tablet Commonly known as:  PRILOSEC D/R Take 20 mg by mouth daily. ondansetron 4 mg disintegrating tablet Commonly known as:  ZOFRAN ODT  
 Take 1 Tab by mouth every eight (8) hours as needed for Nausea. predniSONE 10 mg tablet Commonly known as:  Merlyn Sam Take 40mg (4 tabs) daily for 3 days, then 20mg (2 tabs) daily for 3 days, then 10mg daily for 3 days  
  
 prochlorperazine 10 mg tablet Commonly known as:  COMPAZINE Take 1 Tab by mouth every eight (8) hours as needed for Nausea. promethazine 12.5 mg tablet Commonly known as:  PHENERGAN Take 1 Tab by mouth every six (6) hours as needed for Nausea. We Performed the Following REFERRAL TO OCCUPATIONAL THERAPY [REF53 Custom] Comments:  
 Please evaluate patient for IN MOTION Vestibular therapy Follow-up Instructions Return in about 8 weeks (around 11/17/2017). Referral Information Referral ID Referred By Referred To  
  
 [id number] KIANNA MEDINA Not Available Visits Status Start Date End Date 1 New Request 9/22/17 9/22/18 If your referral has a status of pending review or denied, additional information will be sent to support the outcome of this decision. Patient Instructions Information Regarding Testing If you have physican order for a test or a medication denied by your insurance company, this does not mean the test or medication is not appropriate for you as that is a medical decision, not a decision to be made by an insurance company representative or by an [facility] physician who has not interviewed and examined you. This is a decision to be made between you and your physician. The denial of services is a contractual matter between you and your insurance company, not an issue between your physician and the insurance company. If your test or medication is denied, you can take the following steps to help resolve the issue: 1. File a complaint with the [facility] of Insurance regarding your insurance company's denial of services ordered for you.   You can do this either by calling them directly or by completing an on-line complaint form on the [employer]. This can be found at www.[place].gov 2. Also file a formal complaint with your insurance company and ask to have the name of the person denying the service so that you may explore a legal option should you be harmed by this denial of service. Again, the fact the insurance company will not pay for the service does not mean it is not medically necessary and I would encourage you to follow through with the plan that was made with your physician 3. File a written complaint with your employer so your employer and benefit manager is aware of the poor coverage they are providing their employees. If you have medicare/medicaid, complain to your representative in the House and to your Deepti Key. PRESCRIPTION REFILL POLICY [facility] Statement to Patients April 1, 2014 In an effort to ensure the large volume of patient prescription refills is processed in the most efficient and expeditious manner, we are asking our patients to assist us by calling your Pharmacy for all prescription refills, this will include also your  Mail Order Pharmacy. The pharmacy will contact our office electronically to continue the refill process. Please do not wait until the last minute to call your pharmacy. We need at least 48 hours (2days) to fill prescriptions. We also encourage you to call your pharmacy before going to  your prescription to make sure it is ready. With regard to controlled substance prescription refill requests (narcotic refills) that need to be picked up at our office, we ask your cooperation by providing us with at least 72 hours (3days) notice that you will need a refill. We will not refill narcotic prescription refill requests after 4:00pm on any weekday, Monday through Thursday, or after 2:00pm on Fridays, or on the weekends. We encourage everyone to explore another way of getting your prescription refill request processed using GuestSpan, our patient web portal through our electronic medical record system. GuestSpan is an efficient and effective way to communicate your medication request directly to the office and  downloadable as an smooth on your smart phone . GuestSpan also features a review functionality that allows you to view your medication list as well as leave messages for your physician. Are you ready to get connected? If so please review the attatched instructions or speak to any of our staff to get you set up right away! Thank you so much for your cooperation. Should you have any questions please contact our Practice Administrator. The Physicians and Staff,  [facility] If we have ordered testing for you, we do not call patients with results and we do not give test results over the phone. We schedule follow up appointments so that your results can be discussed in person and any questions you have regarding them may be addressed. If something of concern is revealed on your test, we will call you for a sooner follow up appointment. Additionally, results may be found by using the My Chart feature and one of our patient service representatives at the  can give you instructions on how to access this feature of our electronic medical record system. Tashi Torres 17[address] What is a living will? A living will is a legal form you use to write down the kind of care you want at the end of your life. It is used by the health professionals who will treat you if you aren't able to decide for yourself. If you put your wishes in writing, your loved ones and others will know what kind of care you want. They won't need to guess. This can ease your mind and be helpful to others. A living will is not the same as an estate or property will.  An estate will explains what you want to happen with your money and property after you die. Is a living will a legal document? A living will is a legal document. Each state has its own laws about living cardenas. If you move to another state, make sure that your living will is legal in the state where you now live. Or you might use a universal form that has been approved by many states. This kind of form can sometimes be completed and stored online. Your electronic copy will then be available wherever you have a connection to the Internet. In most cases, doctors will respect your wishes even if you have a form from a different state. · You don't need an  to complete a living will. But legal advice can be helpful if your state's laws are unclear, your health history is complicated, or your family can't agree on what should be in your living will. · You can change your living will at any time. Some people find that their wishes about end-of-life care change as their health changes. · In addition to making a living will, think about completing a medical power of  form. This form lets you name the person you want to make end-of-life treatment decisions for you (your \"health care agent\") if you're not able to. Many hospitals and nursing homes will give you the forms you need to complete a living will and a medical power of . · Your living will is used only if you can't make or communicate decisions for yourself anymore. If you become able to make decisions again, you can accept or refuse any treatment, no matter what you wrote in your living will. · Your state may offer an online registry. This is a place where you can store your living will online so the doctors and nurses who need to treat you can find it right away. What should you think about when creating a living will?  
Talk about your end-of-life wishes with your family members and your doctor. Let them know what you want. That way the people making decisions for you won't be surprised by your choices. Think about these questions as you make your living will: · Do you know enough about life support methods that might be used? If not, talk to your doctor so you know what might be done if you can't breathe on your own, your heart stops, or you're unable to swallow. · What things would you still want to be able to do after you receive life-support methods? Would you want to be able to walk? To speak? To eat on your own? To live without the help of machines? · If you have a choice, where do you want to be cared for? In your home? At a hospital or nursing home? · Do you want certain Taoist practices performed if you become very ill? · If you have a choice at the end of your life, where would you prefer to die? At home? In a hospital or nursing home? Somewhere else? · Would you prefer to be buried or cremated? · Do you want your organs to be donated after you die? What should you do with your living will? · Make sure that your family members and your health care agent have copies of your living will. · Give your doctor a copy of your living will to keep in your medical record. If you have more than one doctor, make sure that each one has a copy. · You may want to put a copy of your living will where it can be easily found. Where can you learn more? Go to http://abdullahi-gallito.info/. Enter C392 in the search box to learn more about \"Learning About Living Js Steele. \" Current as of: August 8, 2016 Content Version: 11.3 © 9008-9796 "[employer]. Care instructions adapted under license by [employer] (which disclaims liability or warranty for this information).  If you have questions about a medical condition or this instruction, always ask your healthcare professional. [address]  any warranty or liability for your use of this information. Introducing [facility]! [facility] introduces VidBid patient portal. Now you can access parts of your medical record, email your doctor's office, and request medication refills online. 1. In your internet browser, go to [email or web address] 2. Click on the First Time User? Click Here link in the Sign In box. You will see the New Member Sign Up page. 3. Enter your VidBid Access Code exactly as it appears below. You will not need to use this code after youve completed the sign-up process. If you do not sign up before the expiration date, you must request a new code. · VidBid Access Code: [email or web address] Expires: 11/10/2017  7:40 AM 
 
4. Enter the last four digits of your Social Security Number (xxxx) and Date of Birth (mm/dd/yyyy) as indicated and click Submit. You will be taken to the next sign-up page. 5. Create a VidBid ID. This will be your VidBid login ID and cannot be changed, so think of one that is secure and easy to remember. 6. Create a VidBid password. You can change your password at any time. 7. Enter your Password Reset Question and Answer. This can be used at a later time if you forget your password. 8. Enter your e-mail address. You will receive e-mail notification when new information is available in [address] 9. Click Sign Up. You can now view and download portions of your medical record. 10. Click the Download Summary menu link to download a portable copy of your medical information. If you have questions, please visit the Frequently Asked Questions section of the VidBid website. Remember, VidBid is NOT to be used for urgent needs. For medical emergencies, dial 911. Now available from your iPhone and Android! Please provide this summary of care documentation to your next provider. Your primary care clinician is listed as Jennifer Verdugo.  If you have any questions after today's visit, please call [phone].

## 2017-09-22 NOTE — PROGRESS NOTES
New patient presenting with dizziness for past 6 weeks. Patient had shingles in right ear and was diagnosed with [place] syndrome by audiology. Went to [facility] due to dizziness on several occassions. Unsteady gait with no falls.

## 2017-09-22 NOTE — PROGRESS NOTES
[address] [facility]   [place] [address]   [place] [address]   [phone] [facility]   [phone] Fax               Chief Complaint   Patient presents with    Dizziness     new patient     Current Outpatient Prescriptions   Medication Sig Dispense Refill    prochlorperazine (COMPAZINE) 10 mg tablet Take 1 Tab by mouth every eight (8) hours as needed for Nausea. 20 Tab 0    meclizine (ANTIVERT) 25 mg tablet Take 1 Tab by mouth two (2) times a day. 30 Tab 0    ondansetron (ZOFRAN ODT) 4 mg disintegrating tablet Take 1 Tab by mouth every eight (8) hours as needed for Nausea. 20 Tab 0    predniSONE (DELTASONE) 10 mg tablet Take 40mg (4 tabs) daily for 3 days, then 20mg (2 tabs) daily for 3 days, then 10mg daily for 3 days 21 Tab 0    promethazine (PHENERGAN) 12.5 mg tablet Take 1 Tab by mouth every six (6) hours as needed for Nausea. 20 Tab 0    Omeprazole delayed release (PRILOSEC D/R) 20 mg tablet Take 20 mg by mouth daily.  erythromycin (ILOTYCIN) ophthalmic ointment Administer  to right eye two (2) times a day. No Known Allergies  Social History   Substance Use Topics    Smoking status: Never Smoker    Smokeless tobacco: Never Used    Alcohol use No     Patient comes for follow-up. He was seen when hospitalized at [facility] with [place] Sweeney syndrome. At that time he was having intractable nausea and vomiting and dizziness. We put him back on a steroid taper to help with that. He notes that he has continued to get some improvement of the Bell's palsy on the right side. He still has some imbalance. Not using the walker now. Still having some nausea but able to keep his food down. Eating small meals. Staying with his father at this point. Able to close the eye. No falls. Still again imbalance. Has not seen ENT yet but is going to call and make an appointment.     Examination  Visit Vitals    /78    Pulse 71    Temp 97.8 °F (36.6 °C)    Resp 17    Ht 5' 9\" (1.753 m)    Wt 64 kg (141 lb)    SpO2 99%    BMI 20.82 kg/m2   He looks well today. Looks better than he did in the hospital.  No icterus. Heart regular. No bruit. No edema of the extremities. He still has peripheral facial droop left side although much improved. He is able to close the eye and I am not able to open it i.e. he is able to resist  I do not see any vesicles in either ear today and the dried vesicles that were on his previous examination in the external auditory canal on the right side are now gone  He has no ataxia. Steady gait. 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 Continue watchful approach for continued improvement with the Bell's palsy  Follow-up with ENT  Follow-up with us in about 6-8 weeks        This note was created using voice recognition software. Despite editing, there may be syntax errors. This note will not be viewable in [address]

## 2017-09-22 NOTE — PATIENT INSTRUCTIONS
Information Regarding Testing     If you have physican order for a test or a medication denied by your insurance company, this does not mean the test or medication is not appropriate for you as that is a medical decision, not a decision to be made by an insurance company representative or by an [facility] physician who has not interviewed and examined you. This is a decision to be made between you and your physician. The denial of services is a contractual matter between you and your insurance company, not an issue between your physician and the insurance company. If your test or medication is denied, you can take the following steps to help resolve the issue:    1. File a complaint with the [facility] regarding your insurance company's denial of services ordered for you. You can do this either by calling them directly or by completing an on-line complaint form on the [employer]. This can be found at [email or web address]    2. Also file a formal complaint with your insurance company and ask to have the name of the person denying the service so that you may explore a legal option should you be harmed by this denial of service. Again, the fact the insurance company will not pay for the service does not mean it is not medically necessary and I would encourage you to follow through with the plan that was made with your physician    3. File a written complaint with your employer so your employer and benefit manager is aware of the poor coverage they are providing their employees. If you have medicare/medicaid, complain to your representative in the House and to your [place].     [facility]   Statement to Patients  April 1, 2014      In an effort to ensure the large volume of patient prescription refills is processed in the most efficient and expeditious manner, we are asking our patients to assist us by calling your Pharmacy for all prescription refills, this will include also your  Mail Order Pharmacy. The pharmacy will contact our office electronically to continue the refill process. Please do not wait until the last minute to call your pharmacy. We need at least 48 hours (2days) to fill prescriptions. We also encourage you to call your pharmacy before going to  your prescription to make sure it is ready. With regard to controlled substance prescription refill requests (narcotic refills) that need to be picked up at our office, we ask your cooperation by providing us with at least 72 hours (3days) notice that you will need a refill. We will not refill narcotic prescription refill requests after 4:00pm on any weekday, Monday through Thursday, or after 2:00pm on Fridays, or on the weekends. We encourage everyone to explore another way of getting your prescription refill request processed using [employer], our patient web portal through our electronic medical record system. [employer] is an efficient and effective way to communicate your medication request directly to the office and  downloadable as an smooth on your smart phone . [employer] also features a review functionality that allows you to view your medication list as well as leave messages for your physician. Are you ready to get connected? If so please review the attatched instructions or speak to any of our staff to get you set up right away! Thank you so much for your cooperation. Should you have any questions please contact our Practice Administrator. The Physicians and Staff,  [facility]     If we have ordered testing for you, we do not call patients with results and we do not give test results over the phone. We schedule follow up appointments so that your results can be discussed in person and any questions you have regarding them may be addressed.   If something of concern is revealed on your test, we will call you for a sooner follow up appointment. Additionally, results may be found by using the My Chart feature and one of our patient service representatives at the  can give you instructions on how to access this feature of our electronic medical record system. Learning About Living [place]ot  What is a living will? A living will is a legal form you use to write down the kind of care you want at the end of your life. It is used by the health professionals who will treat you if you aren't able to decide for yourself. If you put your wishes in writing, your loved ones and others will know what kind of care you want. They won't need to guess. This can ease your mind and be helpful to others. A living will is not the same as an estate or property will. An estate will explains what you want to happen with your money and property after you die. Is a living will a legal document? A living will is a legal document. Each state has its own laws about living cardenas. If you move to another state, make sure that your living will is legal in the state where you now live. Or you might use a universal form that has been approved by many states. This kind of form can sometimes be completed and stored online. Your electronic copy will then be available wherever you have a connection to the Internet. In most cases, doctors will respect your wishes even if you have a form from a different state. · You don't need an  to complete a living will. But legal advice can be helpful if your state's laws are unclear, your health history is complicated, or your family can't agree on what should be in your living will. · You can change your living will at any time. Some people find that their wishes about end-of-life care change as their health changes. · In addition to making a living will, think about completing a medical power of  form.  This form lets you name the person you want to make end-of-life treatment decisions for you (your \"health care agent\") if you're not able to. Many hospitals and nursing homes will give you the forms you need to complete a living will and a medical power of . · Your living will is used only if you can't make or communicate decisions for yourself anymore. If you become able to make decisions again, you can accept or refuse any treatment, no matter what you wrote in your living will. · Your state may offer an online registry. This is a place where you can store your living will online so the doctors and nurses who need to treat you can find it right away. What should you think about when creating a living will? Talk about your end-of-life wishes with your family members and your doctor. Let them know what you want. That way the people making decisions for you won't be surprised by your choices. Think about these questions as you make your living will:  · Do you know enough about life support methods that might be used? If not, talk to your doctor so you know what might be done if you can't breathe on your own, your heart stops, or you're unable to swallow. · What things would you still want to be able to do after you receive life-support methods? Would you want to be able to walk? To speak? To eat on your own? To live without the help of machines? · If you have a choice, where do you want to be cared for? In your home? At a hospital or nursing home? · Do you want certain Taoism practices performed if you become very ill? · If you have a choice at the end of your life, where would you prefer to die? At home? In a hospital or nursing home? Somewhere else? · Would you prefer to be buried or cremated? · Do you want your organs to be donated after you die? What should you do with your living will? · Make sure that your family members and your health care agent have copies of your living will. · Give your doctor a copy of your living will to keep in your medical record.  If you have more than one doctor, make sure that each one has a copy. · You may want to put a copy of your living will where it can be easily found. Where can you learn more? Go to http://abdullahi-gallito.info/. Enter J598 in the search box to learn more about \"Learning About Living [place]. \"  Current as of: August 8, 2016  Content Version: 11.3  © 9779-2373 [employer]. Care instructions adapted under license by [employer] (which disclaims liability or warranty for this information). If you have questions about a medical condition or this instruction, always ask your healthcare professional. [address] any warranty or liability for your use of this information.

## 2019-10-03 ENCOUNTER — APPOINTMENT (OUTPATIENT)
Dept: CT IMAGING | Age: 54
End: 2019-10-03
Attending: EMERGENCY MEDICINE
Payer: COMMERCIAL

## 2019-10-03 ENCOUNTER — HOSPITAL ENCOUNTER (EMERGENCY)
Age: 54
Discharge: HOME | End: 2019-10-03
Attending: EMERGENCY MEDICINE
Payer: COMMERCIAL

## 2019-10-03 VITALS
WEIGHT: 175 LBS | BODY MASS INDEX: 25.92 KG/M2 | TEMPERATURE: 97.7 F | SYSTOLIC BLOOD PRESSURE: 128 MMHG | HEART RATE: 76 BPM | OXYGEN SATURATION: 94 % | RESPIRATION RATE: 22 BRPM | HEIGHT: 69 IN | DIASTOLIC BLOOD PRESSURE: 100 MMHG

## 2019-10-03 DIAGNOSIS — N20.0 KIDNEY STONE: Primary | ICD-10-CM

## 2019-10-03 DIAGNOSIS — N13.2 URETERAL STONE WITH HYDRONEPHROSIS: ICD-10-CM

## 2019-10-03 LAB
ALBUMIN SERPL-MCNC: 3.9 G/DL (ref 3.5–5)
ALBUMIN/GLOB SERPL: 1.2 {RATIO} (ref 1.1–2.2)
ALP SERPL-CCNC: 177 U/L (ref 45–117)
ALT SERPL-CCNC: 80 U/L (ref 12–78)
ANION GAP SERPL CALC-SCNC: 5 MMOL/L (ref 5–15)
APPEARANCE UR: CLEAR
AST SERPL-CCNC: 99 U/L (ref 15–37)
BACTERIA URNS QL MICRO: NEGATIVE /HPF
BASOPHILS # BLD: 0.1 K/UL (ref 0–0.1)
BASOPHILS NFR BLD: 1 % (ref 0–1)
BILIRUB SERPL-MCNC: 1.2 MG/DL (ref 0.2–1)
BILIRUB UR QL CFM: NEGATIVE
BUN SERPL-MCNC: 16 MG/DL (ref 6–20)
BUN/CREAT SERPL: 12 (ref 12–20)
CALCIUM SERPL-MCNC: 9.2 MG/DL (ref 8.5–10.1)
CHLORIDE SERPL-SCNC: 105 MMOL/L (ref 97–108)
CO2 SERPL-SCNC: 28 MMOL/L (ref 21–32)
COLOR UR: ABNORMAL
COMMENT, HOLDF: NORMAL
CREAT SERPL-MCNC: 1.37 MG/DL (ref 0.7–1.3)
DIFFERENTIAL METHOD BLD: NORMAL
EOSINOPHIL # BLD: 0.2 K/UL (ref 0–0.4)
EOSINOPHIL NFR BLD: 2 % (ref 0–7)
EPITH CASTS URNS QL MICRO: ABNORMAL /LPF
ERYTHROCYTE [DISTWIDTH] IN BLOOD BY AUTOMATED COUNT: 13 % (ref 11.5–14.5)
GLOBULIN SER CALC-MCNC: 3.3 G/DL (ref 2–4)
GLUCOSE SERPL-MCNC: 95 MG/DL (ref 65–100)
GLUCOSE UR STRIP.AUTO-MCNC: NEGATIVE MG/DL
HCT VFR BLD AUTO: 45.2 % (ref 36.6–50.3)
HGB BLD-MCNC: 15.1 G/DL (ref 12.1–17)
HGB UR QL STRIP: ABNORMAL
HYALINE CASTS URNS QL MICRO: ABNORMAL /LPF (ref 0–5)
IMM GRANULOCYTES # BLD AUTO: 0 K/UL (ref 0–0.04)
IMM GRANULOCYTES NFR BLD AUTO: 0 % (ref 0–0.5)
KETONES UR QL STRIP.AUTO: NEGATIVE MG/DL
LEUKOCYTE ESTERASE UR QL STRIP.AUTO: NEGATIVE
LIPASE SERPL-CCNC: 281 U/L (ref 73–393)
LYMPHOCYTES # BLD: 1.9 K/UL (ref 0.8–3.5)
LYMPHOCYTES NFR BLD: 19 % (ref 12–49)
MCH RBC QN AUTO: 29 PG (ref 26–34)
MCHC RBC AUTO-ENTMCNC: 33.4 G/DL (ref 30–36.5)
MCV RBC AUTO: 86.9 FL (ref 80–99)
MONOCYTES # BLD: 0.9 K/UL (ref 0–1)
MONOCYTES NFR BLD: 9 % (ref 5–13)
NEUTS SEG # BLD: 6.9 K/UL (ref 1.8–8)
NEUTS SEG NFR BLD: 69 % (ref 32–75)
NITRITE UR QL STRIP.AUTO: NEGATIVE
NRBC # BLD: 0 K/UL (ref 0–0.01)
NRBC BLD-RTO: 0 PER 100 WBC
PH UR STRIP: 6 [PH] (ref 5–8)
PLATELET # BLD AUTO: 266 K/UL (ref 150–400)
PMV BLD AUTO: 10.2 FL (ref 8.9–12.9)
POTASSIUM SERPL-SCNC: 3.7 MMOL/L (ref 3.5–5.1)
PROT SERPL-MCNC: 7.2 G/DL (ref 6.4–8.2)
PROT UR STRIP-MCNC: ABNORMAL MG/DL
RBC # BLD AUTO: 5.2 M/UL (ref 4.1–5.7)
RBC #/AREA URNS HPF: ABNORMAL /HPF (ref 0–5)
SAMPLES BEING HELD,HOLD: NORMAL
SODIUM SERPL-SCNC: 138 MMOL/L (ref 136–145)
SP GR UR REFRACTOMETRY: 1.03 (ref 1–1.03)
UA: UC IF INDICATED,UAUC: ABNORMAL
UROBILINOGEN UR QL STRIP.AUTO: 1 EU/DL (ref 0.2–1)
WBC # BLD AUTO: 10 K/UL (ref 4.1–11.1)
WBC URNS QL MICRO: ABNORMAL /HPF (ref 0–4)

## 2019-10-03 PROCEDURE — 96374 THER/PROPH/DIAG INJ IV PUSH: CPT

## 2019-10-03 PROCEDURE — 80053 COMPREHEN METABOLIC PANEL: CPT

## 2019-10-03 PROCEDURE — 74177 CT ABD & PELVIS W/CONTRAST: CPT

## 2019-10-03 PROCEDURE — 81001 URINALYSIS AUTO W/SCOPE: CPT

## 2019-10-03 PROCEDURE — 99285 EMERGENCY DEPT VISIT HI MDM: CPT

## 2019-10-03 PROCEDURE — 83690 ASSAY OF LIPASE: CPT

## 2019-10-03 PROCEDURE — 74011636320 HC RX REV CODE- 636/320: Performed by: RADIOLOGY

## 2019-10-03 PROCEDURE — 36415 COLL VENOUS BLD VENIPUNCTURE: CPT

## 2019-10-03 PROCEDURE — 74011250636 HC RX REV CODE- 250/636: Performed by: EMERGENCY MEDICINE

## 2019-10-03 PROCEDURE — 85025 COMPLETE CBC W/AUTO DIFF WBC: CPT

## 2019-10-03 RX ORDER — TAMSULOSIN HYDROCHLORIDE 0.4 MG/1
0.4 CAPSULE ORAL DAILY
Qty: 15 CAP | Refills: 0 | Status: SHIPPED | OUTPATIENT
Start: 2019-10-03 | End: 2019-10-18

## 2019-10-03 RX ORDER — CEPHALEXIN 500 MG/1
500 CAPSULE ORAL 4 TIMES DAILY
Qty: 28 CAP | Refills: 0 | Status: SHIPPED | OUTPATIENT
Start: 2019-10-03 | End: 2019-10-10

## 2019-10-03 RX ORDER — ONDANSETRON 4 MG/1
4 TABLET, ORALLY DISINTEGRATING ORAL
Qty: 15 TAB | Refills: 0 | Status: SHIPPED | OUTPATIENT
Start: 2019-10-03

## 2019-10-03 RX ORDER — MORPHINE SULFATE 4 MG/ML
4 INJECTION INTRAVENOUS
Status: COMPLETED | OUTPATIENT
Start: 2019-10-03 | End: 2019-10-03

## 2019-10-03 RX ORDER — KETOROLAC TROMETHAMINE 10 MG/1
10 TABLET, FILM COATED ORAL
Qty: 20 TAB | Refills: 0 | Status: SHIPPED | OUTPATIENT
Start: 2019-10-03

## 2019-10-03 RX ADMIN — MORPHINE SULFATE 4 MG: 4 INJECTION INTRAVENOUS at 08:24

## 2019-10-03 RX ADMIN — IOPAMIDOL 100 ML: 755 INJECTION, SOLUTION INTRAVENOUS at 10:49

## 2019-10-03 NOTE — ED NOTES
Patient given discharge instructions per provider, 4 RX given. Patient verbalized understanding. Ambulatory from ED to home with family.

## 2019-10-03 NOTE — ED PROVIDER NOTES
49 y.o. male with past medical history significant for scarlet fever and spinal contusion who presents from private vehic with chief complaint of abdominal pain. Pt c/o awaking with right flank pain this morning. Pt reports he later developed non radiating RLQ abdominal pain. Pt also c/o dark urine and constipation over the past couple of days. Pt notes his last bowel movement was 3 days ago. Pt states he applied [employer] without relief PTA. NKDA. Pt denies taking any pain medication PTA. Pt denies hx of appendectomy or cholecystectomy. Pt denies urinary frequency, hematuria, N/V, or testicle pain. There are no other acute medical concerns at this time. Note written by Mame Valdivia, as dictated by Cody Massey MD 7:46 AM        The history is provided by the patient and a relative. No  was used. Past Medical History:   Diagnosis Date    H/O scarlet fever     Hearing loss     Herpes zoster virus infection of face and ear nerves     8/2017: With facial weakness on right.     Ill-defined condition     spinal contusion    Ill-defined condition     broken bone in left knee    Leg swelling        Past Surgical History:   Procedure Laterality Date    HX OTHER SURGICAL      surgery on neck    HX UROLOGICAL Right     surgery/biopsy on right kidney         Family History:   Problem Relation Age of Onset    Hypertension Father        Social History     Socioeconomic History    Marital status: SINGLE     Spouse name: Not on file    Number of children: Not on file    Years of education: Not on file    Highest education level: Not on file   Occupational History    Not on file   Social Needs    Financial resource strain: Not on file    Food insecurity:     Worry: Not on file     Inability: Not on file    Transportation needs:     Medical: Not on file     Non-medical: Not on file   Tobacco Use    Smoking status: Never Smoker    Smokeless tobacco: Never Used   Substance and Sexual Activity    Alcohol use: No    Drug use: No    Sexual activity: Not on file   Lifestyle    Physical activity:     Days per week: Not on file     Minutes per session: Not on file    Stress: Not on file   Relationships    Social connections:     Talks on phone: Not on file     Gets together: Not on file     Attends Scientology service: Not on file     Active member of club or organization: Not on file     Attends meetings of clubs or organizations: Not on file     Relationship status: Not on file    Intimate partner violence:     Fear of current or ex partner: Not on file     Emotionally abused: Not on file     Physically abused: Not on file     Forced sexual activity: Not on file   Other Topics Concern    Not on file   Social History Narrative    Not on file         ALLERGIES: Patient has no known allergies. Review of Systems   Constitutional: Negative for chills, diaphoresis and fever. HENT: Negative for congestion and trouble swallowing. Eyes: Negative for photophobia and visual disturbance. Respiratory: Negative for cough, chest tightness and shortness of breath. Cardiovascular: Negative for chest pain, palpitations and leg swelling. Gastrointestinal: Positive for abdominal pain and constipation. Negative for diarrhea, nausea and vomiting. Genitourinary: Positive for flank pain. Negative for difficulty urinating, dysuria and frequency. Musculoskeletal: Negative for back pain and myalgias. Skin: Negative for rash and wound. Neurological: Negative for dizziness, weakness, light-headedness and headaches. Hematological: Negative for adenopathy. Does not bruise/bleed easily. Psychiatric/Behavioral: Negative for agitation and confusion. Vitals:    10/03/19 0734   BP: 161/89   Pulse: 69   Resp: 18   Temp: 97.7 °F (36.5 °C)   SpO2: 98%   Weight: 79.4 kg (175 lb)   Height: 5' 9\" (1.753 m)            Physical Exam   Constitutional: He is oriented to person, place, and time.  He appears well-developed and well-nourished. HENT:   Head: Normocephalic and atraumatic. Nose: Nose normal.   Eyes: Pupils are equal, round, and reactive to light. Conjunctivae and EOM are normal.   Neck: Normal range of motion. Neck supple. Cardiovascular: Normal rate, regular rhythm, normal heart sounds and intact distal pulses. Pulmonary/Chest: Effort normal and breath sounds normal.   Abdominal: Soft. Bowel sounds are normal. There is tenderness in the right lower quadrant. There is CVA tenderness. No hernia. RLQ tenderness. Minimal right CVA tenderness. No hernia. Genitourinary:   Genitourinary Comments:  normal.   Musculoskeletal: Normal range of motion. Neurological: He is oriented to person, place, and time. He has normal reflexes. Skin: Skin is warm and dry. Abrasion noted. Old well healed surgical scar to posterior neck, midline. A few abrasions to the lower legs. Psychiatric: He has a normal mood and affect. His behavior is normal.   Nursing note and vitals reviewed. Note written by Mame Jiménez, as dictated by Torri Gamez MD 7:46 AM       [facility]       Procedures    PROGRESS NOTE:  9:42 AM  Re-eval: Pt's pain is improved. 11:16 AM  Pt says he feels better after urinating. His pain has resolved. Awaiting his CT scan results. Pt's CT retunred and shows stone at UVJ with hydro. There is also stranding but no elevated WBC and no infection noted in urine thus I believe this is 2/2 to stone and not infection. Pt feels better after last urination. He has no difficulty voiding, no fever tolerating PO and renal function is normal with stone 4 mm thus will have him follow up outpatient with strict return precautions. Patient's results have been reviewed with them.   Patient and/or family have verbally conveyed their understanding and agreement of the patient's signs, symptoms, diagnosis, treatment and prognosis and additionally agree to follow up as recommended or return to the Emergency Room should their condition change prior to follow-up. Discharge instructions have also been provided to the patient with some educational information regarding their diagnosis as well a list of reasons why they would want to return to the ER prior to their follow-up appointment should their condition change.     Louisa Nichols MD

## 2019-10-03 NOTE — ED NOTES
Introduced self to patient. Patient noted to become bradycardic in the 40's. Patient reported dizziness with this episode.  Placing patient on 5 lead tele

## 2019-10-03 NOTE — DISCHARGE INSTRUCTIONS
Patient Education        Kidney Stone: Care Instructions  Your Care Instructions    Kidney stones are formed when salts, minerals, and other substances normally found in the urine clump together. They can be as small as grains of sand or, rarely, as large as golf balls. While the stone is traveling through the ureter, which is the tube that carries urine from the kidney to the bladder, you will probably feel pain. The pain may be mild or very severe. You may also have some blood in your urine. As soon as the stone reaches the bladder, any intense pain should go away. If a stone is too large to pass on its own, you may need a medical procedure to help you pass the stone. The doctor has checked you carefully, but problems can develop later. If you notice any problems or new symptoms, get medical treatment right away. Follow-up care is a key part of your treatment and safety. Be sure to make and go to all appointments, and call your doctor if you are having problems. It's also a good idea to know your test results and keep a list of the medicines you take. How can you care for yourself at home? · Drink plenty of fluids, enough so that your urine is light yellow or clear like water. If you have kidney, heart, or liver disease and have to limit fluids, talk with your doctor before you increase the amount of fluids you drink. · Take pain medicines exactly as directed. Call your doctor if you think you are having a problem with your medicine. ? If the doctor gave you a prescription medicine for pain, take it as prescribed. ? If you are not taking a prescription pain medicine, ask your doctor if you can take an over-the-counter medicine. Read and follow all instructions on the label. · Your doctor may ask you to strain your urine so that you can collect your kidney stone when it passes. You can use a kitchen strainer or a tea strainer to catch the stone.  Store it in a plastic bag until you see your doctor Patient picked up: prescription and letter.   Identity was verified: Yes  [place] drivers license verified .    again.  Preventing future kidney stones  Some changes in your diet may help prevent kidney stones. Depending on the cause of your stones, your doctor may recommend that you:  · Drink plenty of fluids, enough so that your urine is light yellow or clear like water. If you have kidney, heart, or liver disease and have to limit fluids, talk with your doctor before you increase the amount of fluids you drink. · Limit coffee, tea, and alcohol. Also avoid grapefruit juice. · Do not take more than the recommended daily dose of vitamins C and D.  · Avoid antacids such as Gaviscon, Maalox, Mylanta, or Tums. · Limit the amount of salt (sodium) in your diet. · Eat a balanced diet that is not too high in protein. · Limit foods that are high in a substance called oxalate, which can cause kidney stones. These foods include dark green vegetables, rhubarb, chocolate, wheat bran, nuts, cranberries, and beans. When should you call for help? Call your doctor now or seek immediate medical care if:    · You cannot keep down fluids.     · Your pain gets worse.     · You have a fever or chills.     · You have new or worse pain in your back just below your rib cage (the flank area).     · You have new or more blood in your urine.    Watch closely for changes in your health, and be sure to contact your doctor if:    · You do not get better as expected. Where can you learn more? Go to http://abdullahi-gallito.info/. Enter E073 in the search box to learn more about \"Kidney Stone: Care Instructions. \"  Current as of: October 31, 2018  Content Version: 12.2  © 0327-3125 [employer]. Care instructions adapted under license by [employer] (which disclaims liability or warranty for this information).  If you have questions about a medical condition or this instruction, always ask your healthcare professional. [address] any warranty or liability for your use of this information.

## 2019-10-05 ENCOUNTER — HOSPITAL ENCOUNTER (EMERGENCY)
Age: 54
Discharge: HOME | End: 2019-10-05
Attending: EMERGENCY MEDICINE
Payer: COMMERCIAL

## 2019-10-05 ENCOUNTER — APPOINTMENT (OUTPATIENT)
Dept: CT IMAGING | Age: 54
End: 2019-10-05
Attending: EMERGENCY MEDICINE
Payer: COMMERCIAL

## 2019-10-05 VITALS
OXYGEN SATURATION: 97 % | TEMPERATURE: 97.9 F | HEART RATE: 67 BPM | HEIGHT: 69 IN | RESPIRATION RATE: 22 BRPM | BODY MASS INDEX: 25.92 KG/M2 | SYSTOLIC BLOOD PRESSURE: 105 MMHG | DIASTOLIC BLOOD PRESSURE: 75 MMHG | WEIGHT: 175 LBS

## 2019-10-05 DIAGNOSIS — N20.0 KIDNEY STONE: Primary | ICD-10-CM

## 2019-10-05 LAB
ALBUMIN SERPL-MCNC: 3.7 G/DL (ref 3.5–5)
ALBUMIN/GLOB SERPL: 1.2 {RATIO} (ref 1.1–2.2)
ALP SERPL-CCNC: 154 U/L (ref 45–117)
ALT SERPL-CCNC: 66 U/L (ref 12–78)
ANION GAP SERPL CALC-SCNC: 6 MMOL/L (ref 5–15)
APPEARANCE UR: CLEAR
AST SERPL-CCNC: 57 U/L (ref 15–37)
BACTERIA URNS QL MICRO: NEGATIVE /HPF
BASOPHILS # BLD: 0.1 K/UL (ref 0–0.1)
BASOPHILS NFR BLD: 1 % (ref 0–1)
BILIRUB SERPL-MCNC: 1 MG/DL (ref 0.2–1)
BILIRUB UR QL: NEGATIVE
BUN SERPL-MCNC: 19 MG/DL (ref 6–20)
BUN/CREAT SERPL: 14 (ref 12–20)
CALCIUM SERPL-MCNC: 9 MG/DL (ref 8.5–10.1)
CHLORIDE SERPL-SCNC: 105 MMOL/L (ref 97–108)
CO2 SERPL-SCNC: 27 MMOL/L (ref 21–32)
COLOR UR: ABNORMAL
COMMENT, HOLDF: NORMAL
CREAT SERPL-MCNC: 1.38 MG/DL (ref 0.7–1.3)
DIFFERENTIAL METHOD BLD: ABNORMAL
EOSINOPHIL # BLD: 0.3 K/UL (ref 0–0.4)
EOSINOPHIL NFR BLD: 4 % (ref 0–7)
EPITH CASTS URNS QL MICRO: ABNORMAL /LPF
ERYTHROCYTE [DISTWIDTH] IN BLOOD BY AUTOMATED COUNT: 13.2 % (ref 11.5–14.5)
GLOBULIN SER CALC-MCNC: 3 G/DL (ref 2–4)
GLUCOSE SERPL-MCNC: 93 MG/DL (ref 65–100)
GLUCOSE UR STRIP.AUTO-MCNC: NEGATIVE MG/DL
HCT VFR BLD AUTO: 42.3 % (ref 36.6–50.3)
HGB BLD-MCNC: 14 G/DL (ref 12.1–17)
HGB UR QL STRIP: ABNORMAL
HYALINE CASTS URNS QL MICRO: ABNORMAL /LPF (ref 0–5)
IMM GRANULOCYTES # BLD AUTO: 0.1 K/UL (ref 0–0.04)
IMM GRANULOCYTES NFR BLD AUTO: 1 % (ref 0–0.5)
KETONES UR QL STRIP.AUTO: NEGATIVE MG/DL
LEUKOCYTE ESTERASE UR QL STRIP.AUTO: NEGATIVE
LYMPHOCYTES # BLD: 2.2 K/UL (ref 0.8–3.5)
LYMPHOCYTES NFR BLD: 24 % (ref 12–49)
MCH RBC QN AUTO: 29.1 PG (ref 26–34)
MCHC RBC AUTO-ENTMCNC: 33.1 G/DL (ref 30–36.5)
MCV RBC AUTO: 87.9 FL (ref 80–99)
MONOCYTES # BLD: 0.9 K/UL (ref 0–1)
MONOCYTES NFR BLD: 10 % (ref 5–13)
NEUTS SEG # BLD: 5.7 K/UL (ref 1.8–8)
NEUTS SEG NFR BLD: 60 % (ref 32–75)
NITRITE UR QL STRIP.AUTO: NEGATIVE
NRBC # BLD: 0 K/UL (ref 0–0.01)
NRBC BLD-RTO: 0 PER 100 WBC
PH UR STRIP: 6.5 [PH] (ref 5–8)
PLATELET # BLD AUTO: 232 K/UL (ref 150–400)
PMV BLD AUTO: 10.3 FL (ref 8.9–12.9)
POTASSIUM SERPL-SCNC: 3.8 MMOL/L (ref 3.5–5.1)
PROT SERPL-MCNC: 6.7 G/DL (ref 6.4–8.2)
PROT UR STRIP-MCNC: NEGATIVE MG/DL
RBC # BLD AUTO: 4.81 M/UL (ref 4.1–5.7)
RBC #/AREA URNS HPF: ABNORMAL /HPF (ref 0–5)
SAMPLES BEING HELD,HOLD: NORMAL
SODIUM SERPL-SCNC: 138 MMOL/L (ref 136–145)
SP GR UR REFRACTOMETRY: 1.01 (ref 1–1.03)
UR CULT HOLD, URHOLD: NORMAL
UROBILINOGEN UR QL STRIP.AUTO: 0.2 EU/DL (ref 0.2–1)
WBC # BLD AUTO: 9.2 K/UL (ref 4.1–11.1)
WBC URNS QL MICRO: ABNORMAL /HPF (ref 0–4)

## 2019-10-05 PROCEDURE — 80053 COMPREHEN METABOLIC PANEL: CPT

## 2019-10-05 PROCEDURE — 96375 TX/PRO/DX INJ NEW DRUG ADDON: CPT

## 2019-10-05 PROCEDURE — 85025 COMPLETE CBC W/AUTO DIFF WBC: CPT

## 2019-10-05 PROCEDURE — 96374 THER/PROPH/DIAG INJ IV PUSH: CPT

## 2019-10-05 PROCEDURE — 99284 EMERGENCY DEPT VISIT MOD MDM: CPT

## 2019-10-05 PROCEDURE — 81001 URINALYSIS AUTO W/SCOPE: CPT

## 2019-10-05 PROCEDURE — 36415 COLL VENOUS BLD VENIPUNCTURE: CPT

## 2019-10-05 PROCEDURE — 74176 CT ABD & PELVIS W/O CONTRAST: CPT

## 2019-10-05 PROCEDURE — 74011250636 HC RX REV CODE- 250/636: Performed by: EMERGENCY MEDICINE

## 2019-10-05 RX ORDER — HYDROMORPHONE HYDROCHLORIDE 2 MG/ML
1 INJECTION, SOLUTION INTRAMUSCULAR; INTRAVENOUS; SUBCUTANEOUS ONCE
Status: COMPLETED | OUTPATIENT
Start: 2019-10-05 | End: 2019-10-05

## 2019-10-05 RX ORDER — KETOROLAC TROMETHAMINE 30 MG/ML
30 INJECTION, SOLUTION INTRAMUSCULAR; INTRAVENOUS
Status: COMPLETED | OUTPATIENT
Start: 2019-10-05 | End: 2019-10-05

## 2019-10-05 RX ORDER — ONDANSETRON 2 MG/ML
8 INJECTION INTRAMUSCULAR; INTRAVENOUS
Status: COMPLETED | OUTPATIENT
Start: 2019-10-05 | End: 2019-10-05

## 2019-10-05 RX ORDER — OXYCODONE AND ACETAMINOPHEN 5; 325 MG/1; MG/1
1-2 TABLET ORAL
Qty: 15 TAB | Refills: 0 | Status: SHIPPED | OUTPATIENT
Start: 2019-10-05 | End: 2019-10-08

## 2019-10-05 RX ADMIN — SODIUM CHLORIDE 1000 ML: 900 INJECTION, SOLUTION INTRAVENOUS at 06:22

## 2019-10-05 RX ADMIN — KETOROLAC TROMETHAMINE 30 MG: 30 INJECTION, SOLUTION INTRAMUSCULAR at 06:26

## 2019-10-05 RX ADMIN — ONDANSETRON 8 MG: 2 INJECTION INTRAMUSCULAR; INTRAVENOUS at 06:26

## 2019-10-05 RX ADMIN — HYDROMORPHONE HYDROCHLORIDE 1 MG: 2 INJECTION, SOLUTION INTRAMUSCULAR; INTRAVENOUS; SUBCUTANEOUS at 06:26

## 2019-10-05 NOTE — DISCHARGE INSTRUCTIONS
Patient Education      5mm stone at the Right UVJ. Continue all the other medications prescribed Thursday. If the Toradol is not controlling the pain, you can take 1 or 2 Percocet every 4 hours. It is a strong narcotic pain medication so you can not drive with it. Please call the stone hotline on Monday morning so you can get back in with the urologist for further management. Return to the ED if you have fever, uncontrolled pain or vomiting. Kidney Stone: Care Instructions  Your Care Instructions    Kidney stones are formed when salts, minerals, and other substances normally found in the urine clump together. They can be as small as grains of sand or, rarely, as large as golf balls. While the stone is traveling through the ureter, which is the tube that carries urine from the kidney to the bladder, you will probably feel pain. The pain may be mild or very severe. You may also have some blood in your urine. As soon as the stone reaches the bladder, any intense pain should go away. If a stone is too large to pass on its own, you may need a medical procedure to help you pass the stone. The doctor has checked you carefully, but problems can develop later. If you notice any problems or new symptoms, get medical treatment right away. Follow-up care is a key part of your treatment and safety. Be sure to make and go to all appointments, and call your doctor if you are having problems. It's also a good idea to know your test results and keep a list of the medicines you take. How can you care for yourself at home? · Drink plenty of fluids, enough so that your urine is light yellow or clear like water. If you have kidney, heart, or liver disease and have to limit fluids, talk with your doctor before you increase the amount of fluids you drink. · Take pain medicines exactly as directed. Call your doctor if you think you are having a problem with your medicine.   ? If the doctor gave you a prescription medicine for pain, take it as prescribed. ? If you are not taking a prescription pain medicine, ask your doctor if you can take an over-the-counter medicine. Read and follow all instructions on the label. · Your doctor may ask you to strain your urine so that you can collect your kidney stone when it passes. You can use a kitchen strainer or a tea strainer to catch the stone. Store it in a plastic bag until you see your doctor again. Preventing future kidney stones  Some changes in your diet may help prevent kidney stones. Depending on the cause of your stones, your doctor may recommend that you:  · Drink plenty of fluids, enough so that your urine is light yellow or clear like water. If you have kidney, heart, or liver disease and have to limit fluids, talk with your doctor before you increase the amount of fluids you drink. · Limit coffee, tea, and alcohol. Also avoid grapefruit juice. · Do not take more than the recommended daily dose of vitamins C and D.  · Avoid antacids such as Gaviscon, Maalox, Mylanta, or Tums. · Limit the amount of salt (sodium) in your diet. · Eat a balanced diet that is not too high in protein. · Limit foods that are high in a substance called oxalate, which can cause kidney stones. These foods include dark green vegetables, rhubarb, chocolate, wheat bran, nuts, cranberries, and beans. When should you call for help? Call your doctor now or seek immediate medical care if:    · You cannot keep down fluids.     · Your pain gets worse.     · You have a fever or chills.     · You have new or worse pain in your back just below your rib cage (the flank area).     · You have new or more blood in your urine.    Watch closely for changes in your health, and be sure to contact your doctor if:    · You do not get better as expected. Where can you learn more? Go to http://abdullahi-gallito.info/.   Enter E902 in the search box to learn more about \"Kidney Stone: Care Instructions. \"  Current as of: October 31, 2018  Content Version: 12.2  © 7281-7322 [employer]. Care instructions adapted under license by [employer] (which disclaims liability or warranty for this information). If you have questions about a medical condition or this instruction, always ask your healthcare professional. [place] [address] any warranty or liability for your use of this information.

## 2019-10-05 NOTE — ED PROVIDER NOTES
49 y.o. male with past medical history significant for scarlet fever and spinal contusion who presents from private vehic with chief complaint of abdominal pain. Pt c/o awaking with right flank pain this morning. Pt reports he later developed non radiating RLQ abdominal pain. Pt also c/o dark urine and constipation over the past couple of days. Pt notes his last bowel movement was 3 days ago. Pt states he applied [employer] without relief PTA. NKDA. Pt denies taking any pain medication PTA. Pt denies hx of appendectomy or cholecystectomy. Pt denies urinary frequency, hematuria, N/V, or testicle pain. There are no other acute medical concerns at this time. Note written by Mame Valdivia, as dictated by Cody Massey MD 7:46 AM 
 
 
 
The history is provided by the patient and a relative. No  was used. Past Medical History:  
Diagnosis Date  H/O scarlet fever  Hearing loss  Herpes zoster virus infection of face and ear nerves 8/2017: With facial weakness on right.  Ill-defined condition   
 spinal contusion  Ill-defined condition   
 broken bone in left knee  Leg swelling Past Surgical History:  
Procedure Laterality Date  HX OTHER SURGICAL    
 surgery on neck  HX UROLOGICAL Right   
 surgery/biopsy on right kidney Family History:  
Problem Relation Age of Onset  Hypertension Father Social History Socioeconomic History  Marital status: SINGLE Spouse name: Not on file  Number of children: Not on file  Years of education: Not on file  Highest education level: Not on file Occupational History  Not on file Social Needs  Financial resource strain: Not on file  Food insecurity:  
  Worry: Not on file Inability: Not on file  Transportation needs:  
  Medical: Not on file Non-medical: Not on file Tobacco Use  Smoking status: Never Smoker  Smokeless tobacco: Never Used Substance and Sexual Activity  Alcohol use: No  
 Drug use: No  
 Sexual activity: Not on file Lifestyle  Physical activity:  
  Days per week: Not on file Minutes per session: Not on file  Stress: Not on file Relationships  Social connections:  
  Talks on phone: Not on file Gets together: Not on file Attends Taoist service: Not on file Active member of club or organization: Not on file Attends meetings of clubs or organizations: Not on file Relationship status: Not on file  Intimate partner violence:  
  Fear of current or ex partner: Not on file Emotionally abused: Not on file Physically abused: Not on file Forced sexual activity: Not on file Other Topics Concern  Not on file Social History Narrative  Not on file ALLERGIES: Patient has no known allergies. Review of Systems Constitutional: Negative for chills, diaphoresis and fever. HENT: Negative for congestion and trouble swallowing. Eyes: Negative for photophobia and visual disturbance. Respiratory: Negative for cough, chest tightness and shortness of breath. Cardiovascular: Negative for chest pain, palpitations and leg swelling. Gastrointestinal: Positive for abdominal pain and constipation. Negative for diarrhea, nausea and vomiting. Genitourinary: Positive for flank pain. Negative for difficulty urinating, dysuria and frequency. Musculoskeletal: Negative for back pain and myalgias. Skin: Negative for rash and wound. Neurological: Negative for dizziness, weakness, light-headedness and headaches. Hematological: Negative for adenopathy. Does not bruise/bleed easily. Psychiatric/Behavioral: Negative for agitation and confusion. Vitals:  
 10/03/19 6884 BP: 161/89 Pulse: 69 Resp: 18 Temp: 97.7 °F (36.5 °C) SpO2: 98% Weight: 79.4 kg (175 lb) Height: 5' 9\" (1.753 m) Physical Exam  
 Constitutional: He is oriented to person, place, and time. He appears well-developed and well-nourished. HENT:  
Head: Normocephalic and atraumatic. Nose: Nose normal.  
Eyes: Pupils are equal, round, and reactive to light. Conjunctivae and EOM are normal.  
Neck: Normal range of motion. Neck supple. Cardiovascular: Normal rate, regular rhythm, normal heart sounds and intact distal pulses. Pulmonary/Chest: Effort normal and breath sounds normal.  
Abdominal: Soft. Bowel sounds are normal. There is tenderness in the right lower quadrant. There is CVA tenderness. No hernia. RLQ tenderness. Minimal right CVA tenderness. No hernia. Genitourinary:  
Genitourinary Comments:  normal.  
Musculoskeletal: Normal range of motion. Neurological: He is oriented to person, place, and time. He has normal reflexes. Skin: Skin is warm and dry. Abrasion noted. Old well healed surgical scar to posterior neck, midline. A few abrasions to the lower legs. Psychiatric: He has a normal mood and affect. His behavior is normal.  
Nursing note and vitals reviewed. Note written by Mame Malcolm, as dictated by William Esparza MD 7:46 AM 
  
 
MDM Procedures PROGRESS NOTE: 
9:42 AM 
Re-eval: Pt's pain is improved. 11:16 AM 
Pt says he feels better after urinating. His pain has resolved. Awaiting his CT scan results. CT shows kidney stone with hydro. He has normal WBC and no infection noted in urine with normal renal function.   Thus the

## 2019-10-05 NOTE — ED TRIAGE NOTES
Patient arrives to ED for complaints of sharp right flank pain rating 8/10. Patient was diagnosed with kidney stone on Thursday and saw Urology on Friday.

## 2019-10-05 NOTE — ED PROVIDER NOTES
HPI     50-year-old gentleman with a history of kidney stones presents to the ED with severe worsening right flank and right lower quadrant abdominal pain. States he was seen here 2 days ago diagnosed with a 4 mm stone which he passed. He saw urology yesterday to confirm that he passed the stone. This morning he woke up with severe pain he has no nausea or vomiting he has no fever that he is urinating. He took 1 of the pain pills he was first prescribed here in Thursday which has not helped his pain he reports the pain is 10 out of 10. Past Medical History:   Diagnosis Date    H/O scarlet fever     Hearing loss     Herpes zoster virus infection of face and ear nerves     8/2017: With facial weakness on right.     Ill-defined condition     spinal contusion    Ill-defined condition     broken bone in left knee    Leg swelling        Past Surgical History:   Procedure Laterality Date    HX OTHER SURGICAL      surgery on neck    HX UROLOGICAL Right     surgery/biopsy on right kidney         Family History:   Problem Relation Age of Onset    Hypertension Father        Social History     Socioeconomic History    Marital status: SINGLE     Spouse name: Not on file    Number of children: Not on file    Years of education: Not on file    Highest education level: Not on file   Occupational History    Not on file   Social Needs    Financial resource strain: Not on file    Food insecurity:     Worry: Not on file     Inability: Not on file    Transportation needs:     Medical: Not on file     Non-medical: Not on file   Tobacco Use    Smoking status: Never Smoker    Smokeless tobacco: Never Used   Substance and Sexual Activity    Alcohol use: No    Drug use: No    Sexual activity: Not on file   Lifestyle    Physical activity:     Days per week: Not on file     Minutes per session: Not on file    Stress: Not on file   Relationships    Social connections:     Talks on phone: Not on file     Gets together: Not on file     Attends Cheondoism service: Not on file     Active member of club or organization: Not on file     Attends meetings of clubs or organizations: Not on file     Relationship status: Not on file    Intimate partner violence:     Fear of current or ex partner: Not on file     Emotionally abused: Not on file     Physically abused: Not on file     Forced sexual activity: Not on file   Other Topics Concern    Not on file   Social History Narrative    Not on file         ALLERGIES: Patient has no known allergies. Review of Systems   Constitutional: Negative for fever. HENT: Negative for congestion. Eyes: Negative for visual disturbance. Respiratory: Negative for cough and shortness of breath. Cardiovascular: Negative for chest pain. Gastrointestinal: Positive for abdominal pain. Negative for nausea and vomiting. Endocrine: Negative for polyuria. Genitourinary: Negative for dysuria. Musculoskeletal: Negative for gait problem. Skin: Negative for rash. Neurological: Negative for headaches. Psychiatric/Behavioral: Negative for dysphoric mood. Vitals:    10/05/19 0615   BP: 139/58   Pulse: 67   Resp: 22   Temp: 97.9 °F (36.6 °C)   SpO2: 98%   Weight: 79.4 kg (175 lb)   Height: 5' 9\" (1.753 m)            Physical Exam   Constitutional: He is oriented to person, place, and time. He appears well-developed and well-nourished. No distress. Appears uncomfortable   HENT:   Head: Normocephalic and atraumatic. Mouth/Throat: No oropharyngeal exudate. Eyes: Pupils are equal, round, and reactive to light. Right eye exhibits no discharge. Left eye exhibits no discharge. No scleral icterus. Neck: Normal range of motion. Neck supple. No JVD present. Cardiovascular: Normal rate, regular rhythm and normal heart sounds. No murmur heard. Pulmonary/Chest: Effort normal and breath sounds normal. No stridor. No respiratory distress. He has no wheezes. He has no rales.  He exhibits no tenderness. Abdominal: Soft. Bowel sounds are normal. He exhibits no distension and no mass. There is no tenderness. There is no rebound and no guarding. Musculoskeletal: Normal range of motion. Neurological: He is oriented to person, place, and time. Skin: Skin is warm and dry. Capillary refill takes less than 2 seconds. No rash noted. Psychiatric: He has a normal mood and affect. His behavior is normal. Judgment and thought content normal.        MDM       Procedures      5mm UVJ stone. Patient feeling much better. He was d/c Thursday with toradol and flomax, zofran and keflex. Will add percocet for better pain control. Follow up with urology on Monday.  Return for fever, uncontrolled pain/voimting